# Patient Record
Sex: MALE | Race: WHITE | NOT HISPANIC OR LATINO | Employment: OTHER | URBAN - METROPOLITAN AREA
[De-identification: names, ages, dates, MRNs, and addresses within clinical notes are randomized per-mention and may not be internally consistent; named-entity substitution may affect disease eponyms.]

---

## 2017-01-11 ENCOUNTER — OFFICE VISIT - HEALTHEAST (OUTPATIENT)
Dept: INTERNAL MEDICINE | Facility: CLINIC | Age: 77
End: 2017-01-11

## 2017-01-11 DIAGNOSIS — E55.9 VITAMIN D DEFICIENCY: ICD-10-CM

## 2017-01-11 DIAGNOSIS — E55.9 HYPOVITAMINOSIS D: ICD-10-CM

## 2017-01-11 DIAGNOSIS — Z79.899 ENCOUNTER FOR LONG-TERM (CURRENT) USE OF OTHER MEDICATIONS: ICD-10-CM

## 2017-01-11 DIAGNOSIS — E78.00 HYPERCHOLESTEREMIA: ICD-10-CM

## 2017-01-11 DIAGNOSIS — I10 BP (HIGH BLOOD PRESSURE): ICD-10-CM

## 2017-01-11 DIAGNOSIS — I10 ESSENTIAL HYPERTENSION: ICD-10-CM

## 2017-01-11 DIAGNOSIS — E66.9 OBESITY (BMI 30-39.9): ICD-10-CM

## 2017-01-11 DIAGNOSIS — Z13.29 SCREENING FOR HYPOTHYROIDISM: ICD-10-CM

## 2017-01-12 ENCOUNTER — COMMUNICATION - HEALTHEAST (OUTPATIENT)
Dept: INTERNAL MEDICINE | Facility: CLINIC | Age: 77
End: 2017-01-12

## 2017-01-12 DIAGNOSIS — E78.5 HYPERLIPIDEMIA: ICD-10-CM

## 2017-01-12 LAB
CHOLEST SERPL-MCNC: 175 MG/DL
FASTING STATUS PATIENT QL REPORTED: YES
HDLC SERPL-MCNC: 50 MG/DL
LDLC SERPL CALC-MCNC: 100 MG/DL
TRIGL SERPL-MCNC: 124 MG/DL

## 2017-04-05 ENCOUNTER — COMMUNICATION - HEALTHEAST (OUTPATIENT)
Dept: INTERNAL MEDICINE | Facility: CLINIC | Age: 77
End: 2017-04-05

## 2017-04-05 DIAGNOSIS — I10 UNSPECIFIED ESSENTIAL HYPERTENSION: ICD-10-CM

## 2017-04-28 ENCOUNTER — COMMUNICATION - HEALTHEAST (OUTPATIENT)
Dept: SCHEDULING | Facility: CLINIC | Age: 77
End: 2017-04-28

## 2017-05-01 ENCOUNTER — AMBULATORY - HEALTHEAST (OUTPATIENT)
Dept: NURSING | Facility: CLINIC | Age: 77
End: 2017-05-01

## 2017-05-23 ENCOUNTER — OFFICE VISIT - HEALTHEAST (OUTPATIENT)
Dept: INTERNAL MEDICINE | Facility: CLINIC | Age: 77
End: 2017-05-23

## 2017-05-23 DIAGNOSIS — Z01.818 PRE-OP EXAMINATION: ICD-10-CM

## 2017-05-23 DIAGNOSIS — Z98.42 S/P CATARACT EXTRACTION, LEFT: ICD-10-CM

## 2017-05-23 ASSESSMENT — MIFFLIN-ST. JEOR: SCORE: 1522.95

## 2017-06-28 ENCOUNTER — COMMUNICATION - HEALTHEAST (OUTPATIENT)
Dept: INTERNAL MEDICINE | Facility: CLINIC | Age: 77
End: 2017-06-28

## 2017-06-28 DIAGNOSIS — I10 UNSPECIFIED ESSENTIAL HYPERTENSION: ICD-10-CM

## 2017-07-14 ENCOUNTER — COMMUNICATION - HEALTHEAST (OUTPATIENT)
Dept: INTERNAL MEDICINE | Facility: CLINIC | Age: 77
End: 2017-07-14

## 2017-08-23 ENCOUNTER — RECORDS - HEALTHEAST (OUTPATIENT)
Dept: ADMINISTRATIVE | Facility: OTHER | Age: 77
End: 2017-08-23

## 2017-11-29 ENCOUNTER — RECORDS - HEALTHEAST (OUTPATIENT)
Dept: ADMINISTRATIVE | Facility: OTHER | Age: 77
End: 2017-11-29

## 2018-06-19 ENCOUNTER — RECORDS - HEALTHEAST (OUTPATIENT)
Dept: ADMINISTRATIVE | Facility: OTHER | Age: 78
End: 2018-06-19

## 2018-12-19 ENCOUNTER — COMMUNICATION - HEALTHEAST (OUTPATIENT)
Dept: TELEHEALTH | Facility: CLINIC | Age: 78
End: 2018-12-19

## 2018-12-19 ENCOUNTER — OFFICE VISIT - HEALTHEAST (OUTPATIENT)
Dept: FAMILY MEDICINE | Facility: CLINIC | Age: 78
End: 2018-12-19

## 2018-12-19 DIAGNOSIS — E78.00 HYPERCHOLESTEREMIA: ICD-10-CM

## 2018-12-19 DIAGNOSIS — I10 ESSENTIAL HYPERTENSION: ICD-10-CM

## 2018-12-19 DIAGNOSIS — M25.512 PAIN IN JOINT OF LEFT SHOULDER: ICD-10-CM

## 2018-12-19 DIAGNOSIS — Z00.00 ROUTINE GENERAL MEDICAL EXAMINATION AT A HEALTH CARE FACILITY: ICD-10-CM

## 2018-12-19 DIAGNOSIS — Z12.5 ENCOUNTER FOR SCREENING FOR MALIGNANT NEOPLASM OF PROSTATE: ICD-10-CM

## 2018-12-19 ASSESSMENT — MIFFLIN-ST. JEOR: SCORE: 1484.4

## 2018-12-27 ENCOUNTER — AMBULATORY - HEALTHEAST (OUTPATIENT)
Dept: LAB | Facility: CLINIC | Age: 78
End: 2018-12-27

## 2018-12-27 DIAGNOSIS — Z12.5 ENCOUNTER FOR SCREENING FOR MALIGNANT NEOPLASM OF PROSTATE: ICD-10-CM

## 2018-12-27 DIAGNOSIS — Z00.00 ROUTINE GENERAL MEDICAL EXAMINATION AT A HEALTH CARE FACILITY: ICD-10-CM

## 2018-12-27 LAB
ALBUMIN SERPL-MCNC: 3.5 G/DL (ref 3.5–5)
ALP SERPL-CCNC: 42 U/L (ref 45–120)
ALT SERPL W P-5'-P-CCNC: 17 U/L (ref 0–45)
ANION GAP SERPL CALCULATED.3IONS-SCNC: 8 MMOL/L (ref 5–18)
AST SERPL W P-5'-P-CCNC: 22 U/L (ref 0–40)
BASOPHILS # BLD AUTO: 0 THOU/UL (ref 0–0.2)
BASOPHILS NFR BLD AUTO: 1 % (ref 0–2)
BILIRUB SERPL-MCNC: 0.6 MG/DL (ref 0–1)
BUN SERPL-MCNC: 13 MG/DL (ref 8–28)
CALCIUM SERPL-MCNC: 9.5 MG/DL (ref 8.5–10.5)
CHLORIDE BLD-SCNC: 100 MMOL/L (ref 98–107)
CHOLEST SERPL-MCNC: 150 MG/DL
CO2 SERPL-SCNC: 29 MMOL/L (ref 22–31)
CREAT SERPL-MCNC: 0.86 MG/DL (ref 0.7–1.3)
EOSINOPHIL # BLD AUTO: 0.2 THOU/UL (ref 0–0.4)
EOSINOPHIL NFR BLD AUTO: 3 % (ref 0–6)
ERYTHROCYTE [DISTWIDTH] IN BLOOD BY AUTOMATED COUNT: 12 % (ref 11–14.5)
FASTING STATUS PATIENT QL REPORTED: YES
GFR SERPL CREATININE-BSD FRML MDRD: >60 ML/MIN/1.73M2
GLUCOSE BLD-MCNC: 100 MG/DL (ref 70–125)
HCT VFR BLD AUTO: 39.5 % (ref 40–54)
HDLC SERPL-MCNC: 57 MG/DL
HGB BLD-MCNC: 13.9 G/DL (ref 14–18)
LDLC SERPL CALC-MCNC: 73 MG/DL
LYMPHOCYTES # BLD AUTO: 1.7 THOU/UL (ref 0.8–4.4)
LYMPHOCYTES NFR BLD AUTO: 28 % (ref 20–40)
MCH RBC QN AUTO: 32.1 PG (ref 27–34)
MCHC RBC AUTO-ENTMCNC: 35.3 G/DL (ref 32–36)
MCV RBC AUTO: 91 FL (ref 80–100)
MONOCYTES # BLD AUTO: 0.5 THOU/UL (ref 0–0.9)
MONOCYTES NFR BLD AUTO: 8 % (ref 2–10)
NEUTROPHILS # BLD AUTO: 3.8 THOU/UL (ref 2–7.7)
NEUTROPHILS NFR BLD AUTO: 61 % (ref 50–70)
PLATELET # BLD AUTO: 291 THOU/UL (ref 140–440)
PMV BLD AUTO: 7.6 FL (ref 7–10)
POTASSIUM BLD-SCNC: 4.1 MMOL/L (ref 3.5–5)
PROT SERPL-MCNC: 6.8 G/DL (ref 6–8)
PSA SERPL-MCNC: 0.3 NG/ML (ref 0–6.5)
RBC # BLD AUTO: 4.34 MILL/UL (ref 4.4–6.2)
SODIUM SERPL-SCNC: 137 MMOL/L (ref 136–145)
TRIGL SERPL-MCNC: 101 MG/DL
WBC: 6.2 THOU/UL (ref 4–11)

## 2018-12-28 ENCOUNTER — COMMUNICATION - HEALTHEAST (OUTPATIENT)
Dept: FAMILY MEDICINE | Facility: CLINIC | Age: 78
End: 2018-12-28

## 2019-01-14 ENCOUNTER — COMMUNICATION - HEALTHEAST (OUTPATIENT)
Dept: FAMILY MEDICINE | Facility: CLINIC | Age: 79
End: 2019-01-14

## 2019-01-14 DIAGNOSIS — E78.5 HYPERLIPIDEMIA: ICD-10-CM

## 2019-01-14 DIAGNOSIS — R33.9 URINE RETENTION: ICD-10-CM

## 2019-01-18 ENCOUNTER — RECORDS - HEALTHEAST (OUTPATIENT)
Dept: PEDIATRICS | Facility: CLINIC | Age: 79
End: 2019-01-18

## 2019-05-28 ENCOUNTER — COMMUNICATION - HEALTHEAST (OUTPATIENT)
Dept: FAMILY MEDICINE | Facility: CLINIC | Age: 79
End: 2019-05-28

## 2019-05-28 DIAGNOSIS — I10 ESSENTIAL HYPERTENSION: ICD-10-CM

## 2019-12-02 ENCOUNTER — COMMUNICATION - HEALTHEAST (OUTPATIENT)
Dept: FAMILY MEDICINE | Facility: CLINIC | Age: 79
End: 2019-12-02

## 2019-12-02 DIAGNOSIS — I10 ESSENTIAL HYPERTENSION: ICD-10-CM

## 2019-12-31 ENCOUNTER — COMMUNICATION - HEALTHEAST (OUTPATIENT)
Dept: FAMILY MEDICINE | Facility: CLINIC | Age: 79
End: 2019-12-31

## 2019-12-31 DIAGNOSIS — R33.9 URINE RETENTION: ICD-10-CM

## 2019-12-31 DIAGNOSIS — E78.5 HYPERLIPIDEMIA: ICD-10-CM

## 2020-02-07 ENCOUNTER — AMBULATORY - HEALTHEAST (OUTPATIENT)
Dept: OTHER | Facility: CLINIC | Age: 80
End: 2020-02-07

## 2020-02-07 ENCOUNTER — DOCUMENTATION ONLY (OUTPATIENT)
Dept: OTHER | Facility: CLINIC | Age: 80
End: 2020-02-07

## 2020-02-24 ENCOUNTER — COMMUNICATION - HEALTHEAST (OUTPATIENT)
Dept: FAMILY MEDICINE | Facility: CLINIC | Age: 80
End: 2020-02-24

## 2020-02-24 DIAGNOSIS — I10 ESSENTIAL HYPERTENSION: ICD-10-CM

## 2020-03-02 ENCOUNTER — DOCUMENTATION ONLY (OUTPATIENT)
Dept: OTHER | Facility: CLINIC | Age: 80
End: 2020-03-02

## 2020-03-02 ENCOUNTER — AMBULATORY - HEALTHEAST (OUTPATIENT)
Dept: OTHER | Facility: CLINIC | Age: 80
End: 2020-03-02

## 2020-03-05 ENCOUNTER — OFFICE VISIT - HEALTHEAST (OUTPATIENT)
Dept: FAMILY MEDICINE | Facility: CLINIC | Age: 80
End: 2020-03-05

## 2020-03-05 DIAGNOSIS — I10 ESSENTIAL HYPERTENSION: ICD-10-CM

## 2020-03-05 DIAGNOSIS — Z12.5 ENCOUNTER FOR SCREENING FOR MALIGNANT NEOPLASM OF PROSTATE: ICD-10-CM

## 2020-03-05 DIAGNOSIS — R53.83 FATIGUE, UNSPECIFIED TYPE: ICD-10-CM

## 2020-03-05 DIAGNOSIS — Z00.00 ROUTINE GENERAL MEDICAL EXAMINATION AT A HEALTH CARE FACILITY: ICD-10-CM

## 2020-03-05 LAB
ANION GAP SERPL CALCULATED.3IONS-SCNC: 11 MMOL/L (ref 5–18)
BASOPHILS # BLD AUTO: 0.1 THOU/UL (ref 0–0.2)
BASOPHILS NFR BLD AUTO: 1 % (ref 0–2)
BUN SERPL-MCNC: 13 MG/DL (ref 8–28)
CALCIUM SERPL-MCNC: 9.8 MG/DL (ref 8.5–10.5)
CHLORIDE BLD-SCNC: 97 MMOL/L (ref 98–107)
CHOLEST SERPL-MCNC: 135 MG/DL
CO2 SERPL-SCNC: 25 MMOL/L (ref 22–31)
CREAT SERPL-MCNC: 1.06 MG/DL (ref 0.7–1.3)
EOSINOPHIL # BLD AUTO: 0.2 THOU/UL (ref 0–0.4)
EOSINOPHIL NFR BLD AUTO: 2 % (ref 0–6)
ERYTHROCYTE [DISTWIDTH] IN BLOOD BY AUTOMATED COUNT: 11.6 % (ref 11–14.5)
FASTING STATUS PATIENT QL REPORTED: NORMAL
GFR SERPL CREATININE-BSD FRML MDRD: >60 ML/MIN/1.73M2
GLUCOSE BLD-MCNC: 97 MG/DL (ref 70–125)
HCT VFR BLD AUTO: 43.1 % (ref 40–54)
HDLC SERPL-MCNC: 51 MG/DL
HGB BLD-MCNC: 14.6 G/DL (ref 14–18)
LDLC SERPL CALC-MCNC: 66 MG/DL
LYMPHOCYTES # BLD AUTO: 1.8 THOU/UL (ref 0.8–4.4)
LYMPHOCYTES NFR BLD AUTO: 28 % (ref 20–40)
MCH RBC QN AUTO: 31.6 PG (ref 27–34)
MCHC RBC AUTO-ENTMCNC: 33.8 G/DL (ref 32–36)
MCV RBC AUTO: 94 FL (ref 80–100)
MONOCYTES # BLD AUTO: 0.6 THOU/UL (ref 0–0.9)
MONOCYTES NFR BLD AUTO: 9 % (ref 2–10)
NEUTROPHILS # BLD AUTO: 3.9 THOU/UL (ref 2–7.7)
NEUTROPHILS NFR BLD AUTO: 60 % (ref 50–70)
PLATELET # BLD AUTO: 221 THOU/UL (ref 140–440)
PMV BLD AUTO: 9.3 FL (ref 7–10)
POTASSIUM BLD-SCNC: 4.7 MMOL/L (ref 3.5–5)
PSA SERPL-MCNC: 0.3 NG/ML (ref 0–6.5)
RBC # BLD AUTO: 4.61 MILL/UL (ref 4.4–6.2)
SODIUM SERPL-SCNC: 133 MMOL/L (ref 136–145)
TRIGL SERPL-MCNC: 91 MG/DL
TSH SERPL DL<=0.005 MIU/L-ACNC: 2.55 UIU/ML (ref 0.3–5)
WBC: 6.4 THOU/UL (ref 4–11)

## 2020-03-06 ENCOUNTER — COMMUNICATION - HEALTHEAST (OUTPATIENT)
Dept: FAMILY MEDICINE | Facility: CLINIC | Age: 80
End: 2020-03-06

## 2020-06-24 ENCOUNTER — COMMUNICATION - HEALTHEAST (OUTPATIENT)
Dept: FAMILY MEDICINE | Facility: CLINIC | Age: 80
End: 2020-06-24

## 2020-06-24 DIAGNOSIS — R33.9 URINE RETENTION: ICD-10-CM

## 2020-06-24 DIAGNOSIS — E78.5 HYPERLIPIDEMIA: ICD-10-CM

## 2020-08-07 ENCOUNTER — NURSE TRIAGE (OUTPATIENT)
Dept: NURSING | Facility: CLINIC | Age: 80
End: 2020-08-07

## 2020-08-07 NOTE — TELEPHONE ENCOUNTER
Pt is calling.    He is wanted to get a COVID-19 test, as he was told to call due to his symptoms.  He woke up this AM, not feeling well. Symptoms started yesterday.   Complaining of nausea, vomiting, and diarrhea with some weakness and mild increase in fatigue.   No fever. No shortness of breath, chest pain, wheezing, or cough.  No URI symptoms to report.  He stated that he cannot due any virtual appointment online of through video.   I advised him to schedule a telephone visit with his PCP, or someone else in her office, today, if possible. If the office is full, it is ok to do a telephone visit through the [facility] as well. Care advice reviewed with the patient as well.  I advised him to call back with any new or worsening signs, symptoms, concerns, or questions.   He verbalized understanding.    COVID 19 Nurse Triage Plan/Patient Instructions    Please be aware that novel coronavirus (COVID-19) may be circulating in the community. If you develop symptoms such as fever, cough, or SOB or if you have concerns about the presence of another infection including coronavirus (COVID-19), please contact your health care provider or visit [email or web address].     Disposition/Instructions    Virtual Visit with provider recommended. Reference Visit Selection Guide.    Thank you for taking steps to prevent the spread of this virus.  o Limit your contact with others.  o Wear a simple mask to cover your cough.  o Wash your hands well and often.    Resources    [facility]: About COVID-19: [email or web address]    CDC: What to Do If You're Sick: www.cdc.gov/coronavirus/2019-ncov/about/steps-when-sick.html    CDC: Ending Home Isolation: www.cdc.gov/coronavirus/2019-ncov/hcp/disposition-in-home-patients.html     CDC: Caring for Someone: www.cdc.gov/coronavirus/2019-ncov/if-you-are-sick/care-for-someone.html     [facility]: Interim Guidance for Hospital Discharge to Home:  [email or web address]/coronavirus/hcp/hospdischarge.pdf    [facility] clinical trials (COVID-19 research studies): [email or web address]     Below are the COVID-19 hotlines at the [facility] ([facility]). Interpreters are available.   o For health questions: Call 730-703-4529 or 1-198.973.4863 (7 a.m. to 7 p.m.)  o For questions about schools and childcare: Call 262-175-7170 or 1-164.616.2390 (7 a.m. to 7 p.m.)                     Reason for Disposition    HIGH RISK patient (e.g., age > 64 years, diabetes, heart or lung disease, weak immune system)    Additional Information    Negative: SEVERE difficulty breathing (e.g., struggling for each breath, speaks in single words)    Negative: Difficult to awaken or acting confused (e.g., disoriented, slurred speech)    Negative: Bluish (or gray) lips or face now    Negative: Shock suspected (e.g., cold/pale/clammy skin, too weak to stand, low BP, rapid pulse)    Negative: Sounds like a life-threatening emergency to the triager    Negative: [1] COVID-19 exposure AND [2] no symptoms    Negative: COVID-19 and Breastfeeding, questions about    Negative: [1] Adult with possible COVID-19 symptoms AND [2] triager concerned about severity of symptoms or other causes    Negative: SEVERE or constant chest pain or pressure (Exception: mild central chest pain, present only when coughing)    Negative: MODERATE difficulty breathing (e.g., speaks in phrases, SOB even at rest, pulse 100-120)    Negative: Patient sounds very sick or weak to the triager    Negative: MILD difficulty breathing (e.g., minimal/no SOB at rest, SOB with walking, pulse <100)    Negative: Chest pain or pressure    Negative: Fever > 103 F (39.4 C)    Negative: [1] Fever > 101 F (38.3 C) AND [2] age > 60    Negative: [1] Fever > 100.0 F (37.8 C) AND [2] bedridden (e.g., nursing home patient, CVA, chronic illness, recovering from surgery)    Protocols used:  CORONAVIRUS (COVID-19) DIAGNOSED OR [id number] 5.16.20    Libia Lyn RN  [facility] Triage Nurse Advisor  8/7/2020 at 1:42 PM

## 2020-11-28 ENCOUNTER — COMMUNICATION - HEALTHEAST (OUTPATIENT)
Dept: FAMILY MEDICINE | Facility: CLINIC | Age: 80
End: 2020-11-28

## 2020-11-28 DIAGNOSIS — I10 ESSENTIAL HYPERTENSION: ICD-10-CM

## 2020-12-04 ENCOUNTER — COMMUNICATION - HEALTHEAST (OUTPATIENT)
Dept: FAMILY MEDICINE | Facility: CLINIC | Age: 80
End: 2020-12-04

## 2020-12-04 DIAGNOSIS — I10 ESSENTIAL HYPERTENSION: ICD-10-CM

## 2020-12-14 ENCOUNTER — OFFICE VISIT - HEALTHEAST (OUTPATIENT)
Dept: FAMILY MEDICINE | Facility: CLINIC | Age: 80
End: 2020-12-14

## 2020-12-14 DIAGNOSIS — Z20.822 SUSPECTED 2019 NOVEL CORONAVIRUS INFECTION: ICD-10-CM

## 2020-12-15 ENCOUNTER — AMBULATORY - HEALTHEAST (OUTPATIENT)
Dept: FAMILY MEDICINE | Facility: CLINIC | Age: 80
End: 2020-12-15

## 2020-12-15 DIAGNOSIS — Z20.822 SUSPECTED 2019 NOVEL CORONAVIRUS INFECTION: ICD-10-CM

## 2020-12-16 ENCOUNTER — COMMUNICATION - HEALTHEAST (OUTPATIENT)
Dept: SCHEDULING | Facility: CLINIC | Age: 80
End: 2020-12-16

## 2020-12-21 ENCOUNTER — COMMUNICATION - HEALTHEAST (OUTPATIENT)
Dept: SCHEDULING | Facility: CLINIC | Age: 80
End: 2020-12-21

## 2021-02-18 ENCOUNTER — RECORDS - HEALTHEAST (OUTPATIENT)
Dept: ADMINISTRATIVE | Facility: OTHER | Age: 81
End: 2021-02-18

## 2021-03-01 ENCOUNTER — COMMUNICATION - HEALTHEAST (OUTPATIENT)
Dept: FAMILY MEDICINE | Facility: CLINIC | Age: 81
End: 2021-03-01

## 2021-03-01 DIAGNOSIS — I10 ESSENTIAL HYPERTENSION: ICD-10-CM

## 2021-03-02 RX ORDER — LOSARTAN POTASSIUM AND HYDROCHLOROTHIAZIDE 12.5; 5 MG/1; MG/1
TABLET ORAL
Qty: 90 TABLET | Refills: 2 | Status: SHIPPED | OUTPATIENT
Start: 2021-03-02 | End: 2021-12-19

## 2021-03-29 ENCOUNTER — COMMUNICATION - HEALTHEAST (OUTPATIENT)
Dept: FAMILY MEDICINE | Facility: CLINIC | Age: 81
End: 2021-03-29

## 2021-03-29 DIAGNOSIS — R33.9 URINE RETENTION: ICD-10-CM

## 2021-04-20 ENCOUNTER — COMMUNICATION - HEALTHEAST (OUTPATIENT)
Dept: FAMILY MEDICINE | Facility: CLINIC | Age: 81
End: 2021-04-20

## 2021-04-20 DIAGNOSIS — E78.5 HYPERLIPIDEMIA: ICD-10-CM

## 2021-05-10 ENCOUNTER — COMMUNICATION - HEALTHEAST (OUTPATIENT)
Dept: FAMILY MEDICINE | Facility: CLINIC | Age: 81
End: 2021-05-10

## 2021-05-10 DIAGNOSIS — R33.9 URINE RETENTION: ICD-10-CM

## 2021-05-19 ENCOUNTER — OFFICE VISIT - HEALTHEAST (OUTPATIENT)
Dept: FAMILY MEDICINE | Facility: CLINIC | Age: 81
End: 2021-05-19

## 2021-05-19 DIAGNOSIS — I10 ESSENTIAL HYPERTENSION, BENIGN: ICD-10-CM

## 2021-05-19 DIAGNOSIS — E78.00 HYPERCHOLESTEREMIA: ICD-10-CM

## 2021-05-19 DIAGNOSIS — H35.30 ARMD (AGE RELATED MACULAR DEGENERATION): ICD-10-CM

## 2021-05-19 LAB
ALBUMIN SERPL-MCNC: 3.5 G/DL (ref 3.5–5)
ALP SERPL-CCNC: 46 U/L (ref 45–120)
ALT SERPL W P-5'-P-CCNC: 18 U/L (ref 0–45)
ANION GAP SERPL CALCULATED.3IONS-SCNC: 11 MMOL/L (ref 5–18)
AST SERPL W P-5'-P-CCNC: 24 U/L (ref 0–40)
BASOPHILS # BLD AUTO: 0.1 THOU/UL (ref 0–0.2)
BASOPHILS NFR BLD AUTO: 1 % (ref 0–2)
BILIRUB SERPL-MCNC: 0.5 MG/DL (ref 0–1)
BUN SERPL-MCNC: 20 MG/DL (ref 8–28)
CALCIUM SERPL-MCNC: 8.9 MG/DL (ref 8.5–10.5)
CHLORIDE BLD-SCNC: 99 MMOL/L (ref 98–107)
CHOLEST SERPL-MCNC: 135 MG/DL
CO2 SERPL-SCNC: 25 MMOL/L (ref 22–31)
CREAT SERPL-MCNC: 0.94 MG/DL (ref 0.7–1.3)
EOSINOPHIL # BLD AUTO: 0.2 THOU/UL (ref 0–0.4)
EOSINOPHIL NFR BLD AUTO: 3 % (ref 0–6)
ERYTHROCYTE [DISTWIDTH] IN BLOOD BY AUTOMATED COUNT: 13 % (ref 11–14.5)
FASTING STATUS PATIENT QL REPORTED: NO
GFR SERPL CREATININE-BSD FRML MDRD: >60 ML/MIN/1.73M2
GLUCOSE BLD-MCNC: 103 MG/DL (ref 70–125)
HCT VFR BLD AUTO: 40.5 % (ref 40–54)
HDLC SERPL-MCNC: 46 MG/DL
HGB BLD-MCNC: 13.4 G/DL (ref 14–18)
IMM GRANULOCYTES # BLD: 0 THOU/UL
IMM GRANULOCYTES NFR BLD: 0 %
LDLC SERPL CALC-MCNC: 68 MG/DL
LYMPHOCYTES # BLD AUTO: 2 THOU/UL (ref 0.8–4.4)
LYMPHOCYTES NFR BLD AUTO: 31 % (ref 20–40)
MCH RBC QN AUTO: 31.2 PG (ref 27–34)
MCHC RBC AUTO-ENTMCNC: 33.1 G/DL (ref 32–36)
MCV RBC AUTO: 94 FL (ref 80–100)
MONOCYTES # BLD AUTO: 0.8 THOU/UL (ref 0–0.9)
MONOCYTES NFR BLD AUTO: 12 % (ref 2–10)
NEUTROPHILS # BLD AUTO: 3.5 THOU/UL (ref 2–7.7)
NEUTROPHILS NFR BLD AUTO: 53 % (ref 50–70)
PLATELET # BLD AUTO: 298 THOU/UL (ref 140–440)
PMV BLD AUTO: 9.6 FL (ref 7–10)
POTASSIUM BLD-SCNC: 5 MMOL/L (ref 3.5–5)
PROT SERPL-MCNC: 6.7 G/DL (ref 6–8)
RBC # BLD AUTO: 4.3 MILL/UL (ref 4.4–6.2)
SODIUM SERPL-SCNC: 135 MMOL/L (ref 136–145)
TRIGL SERPL-MCNC: 103 MG/DL
WBC: 6.6 THOU/UL (ref 4–11)

## 2021-05-19 RX ORDER — MELOXICAM 7.5 MG/1
7.5 TABLET ORAL DAILY PRN
Status: SHIPPED | COMMUNITY
Start: 2020-11-02

## 2021-05-20 ENCOUNTER — COMMUNICATION - HEALTHEAST (OUTPATIENT)
Dept: FAMILY MEDICINE | Facility: CLINIC | Age: 81
End: 2021-05-20

## 2021-05-27 VITALS
HEART RATE: 66 BPM | SYSTOLIC BLOOD PRESSURE: 128 MMHG | OXYGEN SATURATION: 97 % | DIASTOLIC BLOOD PRESSURE: 80 MMHG | WEIGHT: 194.6 LBS

## 2021-05-28 ENCOUNTER — RECORDS - HEALTHEAST (OUTPATIENT)
Dept: ADMINISTRATIVE | Facility: CLINIC | Age: 81
End: 2021-05-28

## 2021-05-29 NOTE — TELEPHONE ENCOUNTER
Refill Approved    Rx renewed per Medication Renewal Policy. Medication was last renewed on 12/19/18.    Sophia Beckford, [facility] Connection Triage/Med Refill 5/28/2019     Requested Prescriptions   Pending Prescriptions Disp Refills     losartan-hydrochlorothiazide (HYZAAR) 50-12.5 mg per tablet [Pharmacy Med Name: Losartan Potassium-HCTZ Oral Tablet 50-12.5 MG] 90 tablet 0     Sig: TAKE ONE TABLET BY MOUTH ONE TIME DAILY       Diuretics/Combination Diuretics Refill Protocol  Passed - 5/28/2019  7:47 AM        Passed - Visit with PCP or prescribing provider visit in past 12 months     Last office visit with prescriber/PCP: Visit date not found OR same dept: Visit date not found OR same specialty: Visit date not found  Last physical: 12/19/2018 Last MTM visit: Visit date not found   Next visit within 3 mo: Visit date not found  Next physical within 3 mo: Visit date not found  Prescriber OR PCP: Corie Spears MD  Last diagnosis associated with med order: There are no diagnoses linked to this encounter.  If protocol passes may refill for 12 months if within 3 months of last provider visit (or a total of 15 months).             Passed - Serum Potassium in past 12 months      Lab Results   Component Value Date    Potassium 4.1 12/27/2018             Passed - Serum Sodium in past 12 months      Lab Results   Component Value Date    Sodium 137 12/27/2018             Passed - Blood pressure on file in past 12 months     BP Readings from Last 1 Encounters:   12/19/18 130/74             Passed - Serum Creatinine in past 12 months      Creatinine   Date Value Ref Range Status   12/27/2018 0.86 0.70 - 1.30 mg/dL Final

## 2021-05-30 VITALS — WEIGHT: 200.6 LBS | BODY MASS INDEX: 34.87 KG/M2

## 2021-05-31 VITALS — WEIGHT: 202.8 LBS | BODY MASS INDEX: 34.62 KG/M2 | HEIGHT: 64 IN

## 2021-06-02 ENCOUNTER — RECORDS - HEALTHEAST (OUTPATIENT)
Dept: ADMINISTRATIVE | Facility: CLINIC | Age: 81
End: 2021-06-02

## 2021-06-02 VITALS — WEIGHT: 194.3 LBS | BODY MASS INDEX: 33.17 KG/M2 | HEIGHT: 64 IN

## 2021-06-03 NOTE — TELEPHONE ENCOUNTER
Refill Approved    Rx renewed per Medication Renewal Policy. Medication was last renewed on 5/28/19.    Denise Spangler, [facility] Connection Triage/Med Refill 12/2/2019     Requested Prescriptions   Pending Prescriptions Disp Refills     losartan-hydrochlorothiazide (HYZAAR) 50-12.5 mg per tablet [Pharmacy Med Name: Losartan Potassium-HCTZ Oral Tablet 50-12.5 MG] 90 tablet 0     Sig: TAKE ONE TABLET BY MOUTH ONE TIME DAILY       Diuretics/Combination Diuretics Refill Protocol  Passed - 12/2/2019 12:17 PM        Passed - Visit with PCP or prescribing provider visit in past 12 months     Last office visit with prescriber/PCP: Visit date not found OR same dept: Visit date not found OR same specialty: Visit date not found  Last physical: 12/19/2018 Last MTM visit: Visit date not found   Next visit within 3 mo: Visit date not found  Next physical within 3 mo: Visit date not found  Prescriber OR PCP: Corie Spears MD  Last diagnosis associated with med order: 1. BP - high blood pressure  - losartan-hydrochlorothiazide (HYZAAR) 50-12.5 mg per tablet [Pharmacy Med Name: Losartan Potassium-HCTZ Oral Tablet 50-12.5 MG]; TAKE ONE TABLET BY MOUTH ONE TIME DAILY   Dispense: 90 tablet; Refill: 0    If protocol passes may refill for 12 months if within 3 months of last provider visit (or a total of 15 months).             Passed - Serum Potassium in past 12 months      Lab Results   Component Value Date    Potassium 4.1 12/27/2018             Passed - Serum Sodium in past 12 months      Lab Results   Component Value Date    Sodium 137 12/27/2018             Passed - Blood pressure on file in past 12 months     BP Readings from Last 1 Encounters:   12/19/18 130/74             Passed - Serum Creatinine in past 12 months      Creatinine   Date Value Ref Range Status   12/27/2018 0.86 0.70 - 1.30 mg/dL Final

## 2021-06-04 VITALS
SYSTOLIC BLOOD PRESSURE: 120 MMHG | BODY MASS INDEX: 33.53 KG/M2 | HEART RATE: 62 BPM | DIASTOLIC BLOOD PRESSURE: 76 MMHG | WEIGHT: 192.3 LBS | OXYGEN SATURATION: 94 %

## 2021-06-04 NOTE — TELEPHONE ENCOUNTER
Refill Given    Refill given per Policy, patient informed they are overdue for Office Visit   OV 12/19/18    Denise Spangler, [facility] Connection Triage/Med Refill 12/31/2019    Requested Prescriptions   Pending Prescriptions Disp Refills     tamsulosin (FLOMAX) 0.4 mg cap [Pharmacy Med Name: Tamsulosin HCl Oral Capsule 0.4 MG] 90 capsule 2     Sig: TAKE ONE CAPSULE BY MOUTH AT BEDTIME       Alfuzosin/Tamsulosin/Silodosin Refill Protocol  Failed - 12/31/2019 11:32 AM        Failed - PCP or prescribing provider visit in past 12 months       Last office visit with prescriber/PCP: Visit date not found OR same dept: Visit date not found OR same specialty: Visit date not found  Last physical: 12/19/2018 Last MTM visit: Visit date not found   Next visit within 3 mo: Visit date not found  Next physical within 3 mo: Visit date not found  Prescriber OR PCP: Corie Spears MD  Last diagnosis associated with med order: 1. HISTORY of urinary retention  - tamsulosin (FLOMAX) 0.4 mg cap [Pharmacy Med Name: Tamsulosin HCl Oral Capsule 0.4 MG]; TAKE ONE CAPSULE BY MOUTH AT BEDTIME   Dispense: 90 capsule; Refill: 2    2. Hyperlipidemia  - lovastatin (MEVACOR) 40 MG tablet [Pharmacy Med Name: Lovastatin Oral Tablet 40 MG]; TAKE HALF TABLET BY MOUTH AT BEDTIME   Dispense: 45 tablet; Refill: 2    If protocol passes may refill for 12 months if within 3 months of last provider visit (or a total of 15 months).             lovastatin (MEVACOR) 40 MG tablet [Pharmacy Med Name: Lovastatin Oral Tablet 40 MG] 45 tablet 2     Sig: TAKE HALF TABLET BY MOUTH AT BEDTIME       Statins Refill Protocol (Hmg CoA Reductase Inhibitors) Failed - 12/31/2019 11:32 AM        Failed - PCP or prescribing provider visit in past 12 months      Last office visit with prescriber/PCP: Visit date not found OR same dept: Visit date not found OR same specialty: Visit date not found  Last physical: 12/19/2018 Last MTM visit: Visit date not found   Next visit within 3  mo: Visit date not found  Next physical within 3 mo: Visit date not found  Prescriber OR PCP: Corie Spears MD  Last diagnosis associated with med order: 1. HISTORY of urinary retention  - tamsulosin (FLOMAX) 0.4 mg cap [Pharmacy Med Name: Tamsulosin HCl Oral Capsule 0.4 MG]; TAKE ONE CAPSULE BY MOUTH AT BEDTIME   Dispense: 90 capsule; Refill: 2    2. Hyperlipidemia  - lovastatin (MEVACOR) 40 MG tablet [Pharmacy Med Name: Lovastatin Oral Tablet 40 MG]; TAKE HALF TABLET BY MOUTH AT BEDTIME   Dispense: 45 tablet; Refill: 2    If protocol passes may refill for 12 months if within 3 months of last provider visit (or a total of 15 months).

## 2021-06-06 NOTE — TELEPHONE ENCOUNTER
Called and lvm for patient to call back and schedule patient- Mailed med check letter to address on file

## 2021-06-06 NOTE — PROGRESS NOTES
Family Medicine Office Visit  [facility]  Patient Name: Jonathan Blackwell  Patient Age: 79 y.o.  YOB: 1940  MRN: [id number]    Date of Visit: 3/5/2020  Reason for Office Visit:   Chief Complaint   Patient presents with     Med Check           Assessment / Plan / Medical Decision Makin. BP - high blood pressure  Well controlled - refill medication  - losartan-hydrochlorothiazide (HYZAAR) 50-12.5 mg per tablet; Take 1 tablet by mouth daily.  Dispense: 90 tablet; Refill: 2    2. Routine general medical examination at a health care facility  Routine labs recommended.  Recommend AWV and discussed with pt  - Basic Metabolic Panel  - HM1(CBC and Differential)  - Lipid Cascade RANDOM  - PSA, Annual Screen (Prostatic-Specific Antigen)  - HM1 (CBC with Diff)    3. Encounter for screening for malignant neoplasm of prostate   - PSA, Annual Screen (Prostatic-Specific Antigen)    4. Fatigue, unspecified type  Tired and more fatigued.  Recommend further workup with PFT and thyroid labs.  Pt would like only the labs  - Thyroid [place]        Health Maintenance Review  Health Maintenance   Topic Date Due     ZOSTER VACCINES (2 of 3) 10/03/2016     INFLUENZA VACCINE RULE BASED (1) 2019     FALL RISK ASSESSMENT  2019     MEDICARE ANNUAL WELLNESS VISIT  2021     TD 18+ HE  2024     ADVANCE CARE PLANNING  2025     PNEUMOCOCCAL IMMUNIZATION 65+ LOW/MEDIUM RISK  Completed         I am having Jonathan Blackwell maintain his aspirin, MULTIVITAMIN ORAL, cholecalciferol (vitamin D3), amoxicillin, tamsulosin, lovastatin, and losartan-hydrochlorothiazide.      HPI:  Jonathan Blackwell is a 79 y.o. year old who presents to the office today for med check.  Having to stop walking due to fatigue.  Mall walker and feels like balance is slightly less than previously was.  Wanting to look into lifeline screening, discussed AWV and the workup we could do in the clinic  here but pt declined today.    Review of Systems- pertinent positive in bold:  Constitutional: Fever, chills, night sweats, fainting, weight change, fatigue, seizures, dizziness, sleeping difficulties, loud snoring/pauses in breathing  Eyes: change in vision, blurred or double vision, redness/eye pain  Ears, nose, mouth, throat: change in hearing, ear pain, hoarseness, difficulty swallowing, sores in the mouth or throat  Respiratory: shortness of breath, cough, bloody sputum, wheezing  Cardiovascular: chest pain, palpitations   Gastrointestinal: abdominal pain, heartburn/indigestion, nausea/vomiting, change in appetite, change in bowel habits, constipation or diarrhea, rectal bleeding/dark stools, difficulty swallowing  Urinary: painful urination, frequent urination, urinary urgency/incontinence, blood in urine/dark urine, nocturia  Musculoskeletal: backache/back pain (new or increasing), weakness, joint pain/stiffness (new or increasing), muscle cramps, swelling of hands, feet, ankles, leg pain/redness  Skin: change in moles/freckles, rash, nodules  Hematologic/lymphatic: swollen lymph glands, abnormal bruising/bleeding  Endocrine: excessive thirst/urination, cold or heat intolerance  Neurologic/emotional: worrisome memory change, numbness/tingling, anxiety, mood swings      Current Scheduled Meds:  Outpatient Encounter Medications as of 3/5/2020   Medication Sig Dispense Refill     aspirin 81 MG EC tablet Take 81 mg by mouth daily.        cholecalciferol, vitamin D3, 1,000 unit tablet Take 2,000 Units by mouth daily.       losartan-hydrochlorothiazide (HYZAAR) 50-12.5 mg per tablet Take 1 tablet by mouth daily. 90 tablet 2     lovastatin (MEVACOR) 40 MG tablet TAKE HALF TABLET BY MOUTH AT BEDTIME 45 tablet 1     MULTIVITAMIN ORAL Take 1 tablet by mouth 3 (three) times a week.        tamsulosin (FLOMAX) 0.4 mg cap TAKE ONE CAPSULE BY MOUTH AT BEDTIME 90 capsule 1     [DISCONTINUED] losartan-hydrochlorothiazide  (HYZAAR) 50-12.5 mg per tablet Take 1 tablet by mouth daily. 90 tablet 0     amoxicillin (AMOXIL) 500 MG tablet TAKE 4 TABLETS ONE HOUR BEFORE DENTAL APPOINTMENT 12 tablet 0     No facility-administered encounter medications on file as of 3/5/2020.      Past Medical History:   Diagnosis Date     Anemia 10/9/2014     BPH (benign prostatic hyperplasia)      Hyperlipidemia      Hypertension      Past Surgical History:   Procedure Laterality Date     NC APPENDECTOMY      Description: Appendectomy;  Recorded: 2014;     NC LAP,CHOLECYSTECTOMY      Description: Cholecystectomy Laparoscopic;  Recorded: 2014;     NC RECONSTR TOTAL SHOULDER IMPLANT Left 10/9/2014    Procedure: SHOULDER TOTAL REPLACEMENT ;  Surgeon: Binu Rodriguez MD;  Location: [facility];  Service: Orthopedics     NC RECONSTR TOTAL SHOULDER IMPLANT      Description: Shoulder Arthroplasty Total Shoulder Replacement;  Recorded: 2014;  Comments: LEFT:   9 OCT 2014     Social History     Tobacco Use     Smoking status: Former Smoker     Last attempt to quit: 1995     Years since quittin.6     Smokeless tobacco: Never Used     Tobacco comment: Quit date was in .  Precise date is nominal.   Substance Use Topics     Alcohol use: Yes     Alcohol/week: 1.0 standard drinks     Types: 1 Cans of beer per week     Comment: Beer when golfing.  A little wine.  Does not drink regularly.     Drug use: No       Objective / Physical Examination:  Vitals:    20 1000   BP: 120/76   Patient Site: Right Arm   Patient Position: Sitting   Cuff Size: Adult Regular   Pulse: 62   SpO2: 94%   Weight: 192 lb 4.8 oz (87.2 kg)     Wt Readings from Last 3 Encounters:   20 192 lb 4.8 oz (87.2 kg)   18 194 lb 4.8 oz (88.1 kg)   17 202 lb 12.8 oz (92 kg)     BP Readings from Last 3 Encounters:   20 120/76   18 130/74   17 106/70     Body mass index is 33.53 kg/m .   Results for orders placed or performed in visit on  03/05/20   Basic Metabolic Panel   Result Value Ref Range    Sodium 133 (L) 136 - 145 mmol/L    Potassium 4.7 3.5 - 5.0 mmol/L    Chloride 97 (L) 98 - 107 mmol/L    CO2 25 22 - 31 mmol/L    Anion Gap, Calculation 11 5 - 18 mmol/L    Glucose 97 70 - 125 mg/dL    Calcium 9.8 8.5 - 10.5 mg/dL    BUN 13 8 - 28 mg/dL    Creatinine 1.06 0.70 - 1.30 mg/dL    GFR MDRD Af Amer >60 >60 mL/min/1.73m2    GFR MDRD Non Af Amer >60 >60 mL/min/1.73m2   Lipid Cascade RANDOM   Result Value Ref Range    Cholesterol 135 <=199 mg/dL    Triglycerides 91 <=149 mg/dL    HDL Cholesterol 51 >=40 mg/dL    LDL Calculated 66 <=129 mg/dL    Patient Fasting > 8hrs? Unknown    PSA, Annual Screen (Prostatic-Specific Antigen)   Result Value Ref Range    PSA 0.3 0.0 - 6.5 ng/mL   HM1 (CBC with Diff)   Result Value Ref Range    WBC 6.4 4.0 - 11.0 thou/uL    RBC 4.61 4.40 - 6.20 mill/uL    Hemoglobin 14.6 14.0 - 18.0 g/dL    Hematocrit 43.1 40.0 - 54.0 %    MCV 94 80 - 100 fL    MCH 31.6 27.0 - 34.0 pg    MCHC 33.8 32.0 - 36.0 g/dL    RDW 11.6 11.0 - 14.5 %    Platelets 221 140 - 440 thou/uL    MPV 9.3 7.0 - 10.0 fL    Neutrophils % 60 50 - 70 %    Lymphocytes % 28 20 - 40 %    Monocytes % 9 2 - 10 %    Eosinophils % 2 0 - 6 %    Basophils % 1 0 - 2 %    Neutrophils Absolute 3.9 2.0 - 7.7 thou/uL    Lymphocytes Absolute 1.8 0.8 - 4.4 thou/uL    Monocytes Absolute 0.6 0.0 - 0.9 thou/uL    Eosinophils Absolute 0.2 0.0 - 0.4 thou/uL    Basophils Absolute 0.1 0.0 - 0.2 thou/uL   Thyroid Cascade   Result Value Ref Range    TSH 2.55 0.30 - 5.00 uIU/mL           General Appearance: Alert and oriented, cooperative, affect appropriate, speech clear, in no apparent distress  Head: Normocephalic, atraumatic  Lungs: Clear to auscultation bilaterally. Normal inspiratory and expiratory effort  Cardiovascular: Regular rate, normal S1, S2. No murmurs, rubs, or gallops  Abdomen: Bowel sounds active all four quadrants. Soft, non-tender. No hepatomegaly or splenomegaly.  No bruits detected.   Extremities: Pulses 2+ and equal throughout. No edema. Strength equal throughout.  Integumentary: Warm and dry. Without suspicious looking lesions  Neuro: Alert and oriented, follows commands appropriately.     Orders Placed This Encounter   Procedures     Basic Metabolic Panel     Lipid Cascade RANDOM     PSA, Annual Screen (Prostatic-Specific Antigen)     HM1 (CBC with Diff)     Thyroid [place]   Followup: No follow-ups on file. earlier if needed.    Total time spent with patient was 30 min with >50% of time spent in face-to-face counseling regarding the above plan       Corie Spears MD

## 2021-06-06 NOTE — TELEPHONE ENCOUNTER
Refill NOT Given    Refill given per Policy, patient informed they are overdue for Labs and Office Visit   OV 12/19/18    Denise Spangler, [facility] Connection Triage/Med Refill 2/24/2020    Requested Prescriptions   Pending Prescriptions Disp Refills     losartan-hydrochlorothiazide (HYZAAR) 50-12.5 mg per tablet [Pharmacy Med Name: Losartan Potassium-HCTZ Oral Tablet 50-12.5 MG] 90 tablet 0     Sig: TAKE ONE TABLET BY MOUTH ONE TIME DAILY       Diuretics/Combination Diuretics Refill Protocol  Failed - 2/24/2020 12:50 PM        Failed - Visit with PCP or prescribing provider visit in past 12 months     Last office visit with prescriber/PCP: Visit date not found OR same dept: Visit date not found OR same specialty: Visit date not found  Last physical: 12/19/2018 Last MTM visit: Visit date not found   Next visit within 3 mo: Visit date not found  Next physical within 3 mo: Visit date not found  Prescriber OR PCP: Corie Spears MD  Last diagnosis associated with med order: 1. BP - high blood pressure  - losartan-hydrochlorothiazide (HYZAAR) 50-12.5 mg per tablet [Pharmacy Med Name: Losartan Potassium-HCTZ Oral Tablet 50-12.5 MG]; TAKE ONE TABLET BY MOUTH ONE TIME DAILY   Dispense: 90 tablet; Refill: 0    If protocol passes may refill for 12 months if within 3 months of last provider visit (or a total of 15 months).             Failed - Serum Potassium in past 12 months      No results found for: LN-POTASSIUM          Failed - Serum Sodium in past 12 months      No results found for: LN-SODIUM          Failed - Blood pressure on file in past 12 months     BP Readings from Last 1 Encounters:   12/19/18 130/74             Failed - Serum Creatinine in past 12 months      Creatinine   Date Value Ref Range Status   12/27/2018 0.86 0.70 - 1.30 mg/dL Final

## 2021-06-08 ENCOUNTER — COMMUNICATION - HEALTHEAST (OUTPATIENT)
Dept: FAMILY MEDICINE | Facility: CLINIC | Age: 81
End: 2021-06-08

## 2021-06-08 DIAGNOSIS — E78.5 HYPERLIPIDEMIA: ICD-10-CM

## 2021-06-08 DIAGNOSIS — R33.9 URINE RETENTION: ICD-10-CM

## 2021-06-08 NOTE — PROGRESS NOTES
"1/11/2017     Visit Vitals     /90 (Patient Site: Left Arm, Patient Position: Sitting, Cuff Size: Adult Regular)     Pulse 80     Wt 200 lb 9.6 oz (91 kg)     SpO2 97%     BMI 34.87 kg/m2       Past Medical History   Diagnosis Date     Anemia 10/9/2014     BPH (benign prostatic hyperplasia)      Hyperlipidemia      Hypertension        Social History     Social History     Marital status:      Spouse name: N/A     Number of children: N/A     Years of education: N/A     Occupational History     Not on file.     Social History Main Topics     Smoking status: Former Smoker     Quit date: 7/1/1995     Smokeless tobacco: Never Used      Comment: Quit date was in 1995.  Precise date is nominal.     Alcohol use 0.6 oz/week     1 Cans of beer per week      Comment: Beer when golfing.  A little wine.  Does not drink regularly.     Drug use: No     Sexual activity: Not on file     Other Topics Concern     Not on file     Social History Narrative       Family History   Problem Relation Age of Onset     Other Mother      Cause of death unclear to him.     Dementia Father      \"Might have had a little bit of Alzheimer's coming on.\"     Other Father      Cause of death unclear to him.     Aneurysm Brother      SAH from brain aneurysm.     Diabetes Maternal Grandmother      No Medical Problems Daughter      No Medical Problems Son        As part of this visit I have today personally reviewed past medical history, family medical history, social history (specifically including tobacco use), current medications and intolerances/allergies.  I have updated and/or or corrected these areas of history as may have been appropriate.    Allergies   Allergen Reactions     Lisinopril Other (See Comments)     Swollen lips       Current Outpatient Prescriptions   Medication Sig Note     amoxicillin (AMOXIL) 500 MG tablet TAKE 4 TABLETS ONE HOUR BEFORE DENTAL APPOINTMENT      aspirin 81 MG EC tablet Take 81 mg by mouth 3 (three) times " "a week.      cholecalciferol, vitamin D3, 1,000 unit tablet Take 2,000 Units by mouth daily.      hydrochlorothiazide (HYDRODIURIL) 25 MG tablet Take 1 tablet (25 mg total) by mouth daily. This Rx will not be filled again without seeing a [facility] physician.  Pharm:  Read note. (Patient taking differently: Take 12.5 mg by mouth daily. )      lovastatin (MEVACOR) 40 MG tablet Take 0.5 tablets (20 mg total) by mouth bedtime.      MULTIVITAMIN ORAL Take 1 tablet by mouth 3 (three) times a week.  1/11/2017: Takes this three times a week.  1/11/2017      NIFEdipine (ADALAT CC) 60 MG 24 hr tablet TAKE 1 TABLET BY MOUTH DAILY      tamsulosin (FLOMAX) 0.4 mg Cp24 TAKE ONE CAPSULE BY MOUTH EVERY NIGHT AT BEDTIME.  Pharmacist:  Please read note.        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       The following high BMI interventions were performed this visit: weight monitoring.  I encouraged prudent, restricted calorie diet an    Hypovitaminosis D - will update today.  VITAMIN D, TOTAL (25-HYDROXY)   Date Value Ref Range Status   11/18/2014 57.4 30.0 - 80.0 ng/mL Final       Lipid status - satisfactory cholesterol values on l  Lab Results   Component Value Date    CHOL 154 01/08/2016    CHOL 162 11/18/2014    CHOL 186 01/09/2014     Lab Results   Component Value Date    HDL 51 01/08/2016    HDL 56 11/18/2014    HDL 59 01/09/2014     Lab Results   Component Value Date    LDLCALC 83 01/08/2016    LDLCALC 85 11/18/2014    LDLCALC 102 01/09/2014     Lab Results   Component Value Date    TRIG 99 01/08/2016    " TRIG 106 11/18/2014    TRIG 120 01/09/2014       Thyroid status - will obtain today or shortly.  No results found for: TSH    CBC monitoring - normal when last checked.  Lab Results   Component Value Date    WBC 5.9 01/08/2016    HGB 14.7 01/08/2016    HCT 43.9 01/08/2016    MCV 91 01/08/2016     01/08/2016       Results for orders placed or performed in visit on 01/08/16   Comprehensive Metabolic Panel   Result Value Ref Range    Sodium 133 (L) 136 - 145 mmol/L    Potassium 4.1 3.5 - 5.0 mmol/L    Chloride 97 (L) 98 - 107 mmol/L    CO2 27 22 - 31 mmol/L    Anion Gap, Calculation 9 5 - 18 mmol/L    Glucose 99 70 - 125 mg/dL    BUN 11 8 - 28 mg/dL    Creatinine 0.95 0.70 - 1.30 mg/dL    GFR MDRD Af Amer >60 >60 mL/min/1.73m2    GFR MDRD Non Af Amer >60 >60 mL/min/1.73m2    Bilirubin, Total 0.6 0.0 - 1.0 mg/dL    Calcium 9.9 8.5 - 10.5 mg/dL    Protein, Total 6.9 6.0 - 8.0 g/dL    Albumin 3.8 3.5 - 5.0 g/dL    Alkaline Phosphatase 55 45 - 120 U/L    AST 30 0 - 40 U/L    ALT 26 0 - 45 U/L     Hypertension    History of present illness:    1.  Established hypertension  2.  Control on current medication(s) is borderline  3.  Made worse by not being on BP medication(s)  4.  Made better by being on BP medication(s)  5.  Complications of hypertension - none yet identified.    Review of systems  Although entered by template, all of the system review items listed below have been verified as a result of questions individually asked during today's visit.  Cardiovascular - denies typical and atypical angina, orthopnea, PND, palpitations, edema, syncope and limiting dyspnea.  Pulmonary - denies chronic cough, congestion, wheezing and noisy breathing.  Vision - denies blurred vision, double vision and temporary loss of vision.  NM - denies transient or ongoing motor or sensory complaints as might be seen with TIA or stroke.    Physical examination  General - this is a 76 year old  American gentleman in no  Chest -  CTAP  Heart - regular and without murmur  Ext - no edema    Impression    1.  Hypertension  2.  Obesity  3.  Unknown cholesterol status  4.  Hypercholesterolemia.  5.  History of anemia    Plan    1.  CBC, CMP thyroid cascade, lipid cascade and Vitamin D  2.  RV 6 months.  3.  Follow-up on lab studies if indicated.      Much or all of the text in this note was generated through the use of Dragon Dictate voice-to-text software.  Errors in spelling or words which seem out of context are unintentional.  Dragon has a significant issue with pronouns and, of course, homonyms.  Errors with words of this sort may escape editing.        Patient Instructions   1.  You no longer need to have an antibiotic prior to dental work, but that will change if you have more joint surgery this fall.  The standards for treatment prior to dental work have changed.    2.  Go to the lab today.    3.  Come back in six months for an Annual Wellness Visit.  Make that appointment as you leave today, please.

## 2021-06-09 RX ORDER — TAMSULOSIN HYDROCHLORIDE 0.4 MG/1
CAPSULE ORAL
Qty: 90 CAPSULE | Refills: 3 | Status: SHIPPED | OUTPATIENT
Start: 2021-06-09 | End: 2022-06-24

## 2021-06-09 NOTE — TELEPHONE ENCOUNTER
Refill Approved    Rx renewed per Medication Renewal Policy. Medication was last renewed on 12/31/19.    Sophia Beckford, [facility] Triage/Med Refill 6/25/2020     Requested Prescriptions   Pending Prescriptions Disp Refills     tamsulosin (FLOMAX) 0.4 mg cap [Pharmacy Med Name: Tamsulosin HCl Oral Capsule 0.4 MG] 90 capsule 0     Sig: TAKE ONE CAPSULE BY MOUTH AT BEDTIME       Alfuzosin/Tamsulosin/Silodosin Refill Protocol  Passed - 6/24/2020  1:14 PM        Passed - PCP or prescribing provider visit in past 12 months       Last office visit with prescriber/PCP: 3/5/2020 Corie Spears MD OR same dept: 3/5/2020 Corie Spears MD OR same specialty: 3/5/2020 Corie Spears MD  Last physical: 12/19/2018 Last MTM visit: Visit date not found   Next visit within 3 mo: Visit date not found  Next physical within 3 mo: Visit date not found  Prescriber OR PCP: Corie Spears MD  Last diagnosis associated with med order: 1. HISTORY of urinary retention  - tamsulosin (FLOMAX) 0.4 mg cap [Pharmacy Med Name: Tamsulosin HCl Oral Capsule 0.4 MG]; TAKE ONE CAPSULE BY MOUTH AT BEDTIME  Dispense: 90 capsule; Refill: 0    2. Hyperlipidemia  - lovastatin (MEVACOR) 40 MG tablet [Pharmacy Med Name: Lovastatin Oral Tablet 40 MG]; TAKE HALF TABLET BY MOUTH AT BEDTIME  Dispense: 45 tablet; Refill: 0    If protocol passes may refill for 12 months if within 3 months of last provider visit (or a total of 15 months).                lovastatin (MEVACOR) 40 MG tablet [Pharmacy Med Name: Lovastatin Oral Tablet 40 MG] 45 tablet 0     Sig: TAKE HALF TABLET BY MOUTH AT BEDTIME       Statins Refill Protocol (Hmg CoA Reductase Inhibitors) Passed - 6/24/2020  1:14 PM        Passed - PCP or prescribing provider visit in past 12 months      Last office visit with prescriber/PCP: 3/5/2020 Corie Spears MD OR same dept: 3/5/2020 Corie Spears MD OR same specialty: 3/5/2020 Corie Spears MD  Last physical: 12/19/2018 Last MTM visit: Visit  date not found   Next visit within 3 mo: Visit date not found  Next physical within 3 mo: Visit date not found  Prescriber OR PCP: Corie Spears MD  Last diagnosis associated with med order: 1. HISTORY of urinary retention  - tamsulosin (FLOMAX) 0.4 mg cap [Pharmacy Med Name: Tamsulosin HCl Oral Capsule 0.4 MG]; TAKE ONE CAPSULE BY MOUTH AT BEDTIME  Dispense: 90 capsule; Refill: 0    2. Hyperlipidemia  - lovastatin (MEVACOR) 40 MG tablet [Pharmacy Med Name: Lovastatin Oral Tablet 40 MG]; TAKE HALF TABLET BY MOUTH AT BEDTIME  Dispense: 45 tablet; Refill: 0    If protocol passes may refill for 12 months if within 3 months of last provider visit (or a total of 15 months).

## 2021-06-10 RX ORDER — LOVASTATIN 40 MG
TABLET ORAL
Qty: 45 TABLET | Refills: 0 | Status: SHIPPED | OUTPATIENT
Start: 2021-06-10 | End: 2021-10-25

## 2021-06-10 NOTE — PROGRESS NOTES
"Preoperative H&P    Surgeon: Dr Snow   Date Of Surgery: 5/26/17  Procedure: YAG Capsulotomy - Left Eye    History of Present Illness:  Jonathan Blackwell is a 76 y.o.  male who presents to the office today for a preoperative consultation at the request of Dr. Snow for Yag capsulotomy for cataract correction. He has cloudiness in left eye after cataract extraction There is no history of general or local anesthesia issues in the past. Denies history or recent abnormal bleeding     Review of Systems:   CV: Chest pain- not noted. Shortness of breath- not noted. Edema- not noted. JAMES- not noted. Orthopnea- not noted.   GI: Abdominal pain- not noted. Nausea\vomiting\diarrhea-not noted. Melena or hematochezia- not noted.   : Urgency-not noted. Frequency- not noted. Dysuria- not noted. Hematuria- not noted. Incontinence- not noted.   CNS: Headaches- no significant symptoms. Dizziness- not noted. Visual Changes- cloudy left eye     Physical Examination:    /70 (Patient Site: Right Arm, Patient Position: Sitting, Cuff Size: Adult Large)  Pulse 94  Temp 97  F (36.1  C) (Oral)   Ht 5' 3.5\" (1.613 m)  Wt 202 lb 12.8 oz (92 kg)  SpO2 95%  BMI 35.36 kg/m2  Wt Readings from Last 3 Encounters:   05/23/17 202 lb 12.8 oz (92 kg)   01/11/17 200 lb 9.6 oz (91 kg)   07/08/16 194 lb 3.2 oz (88.1 kg)     Body mass index is 35.36 kg/(m^2). (>25?)    GEN: alert, cooperative, no acute distress  ENT: NCAT. Tympanic membranes: within normal limits. External Canals: within normal limits.   Oropharynx: moist, no lesions, clear. Eyes: PERRLA, Conjunctiva: normal.   Neck: Supple, no adenopathy, no abnormal masses.  Chest: Within normal limits.  Cardiac: RRR, no rubs, no gallops.  Lungs: Breath Sounds normal, no crackles, no wheezing, no rhonchi.   Abd: soft, bowel sounds normal, no tenderness.  Extr: no edema.   Skin: No rashes    Outpatient Encounter Prescriptions as of 5/23/2017   Medication Sig Dispense Refill     amoxicillin " (AMOXIL) 500 MG tablet TAKE 4 TABLETS ONE HOUR BEFORE DENTAL APPOINTMENT 12 tablet 0     aspirin 81 MG EC tablet Take 81 mg by mouth 3 (three) times a week.       cholecalciferol, vitamin D3, 1,000 unit tablet Take 2,000 Units by mouth daily.       hydroCHLOROthiazide (HYDRODIURIL) 25 MG tablet TAKE 1 TABLET BY MOUTH DAILY 90 tablet 0     lovastatin (MEVACOR) 40 MG tablet TAKE 1/2 TABLET(20 MG) BY MOUTH EVERY NIGHT AT BEDTIME 45 tablet 2     MULTIVITAMIN ORAL Take 1 tablet by mouth 3 (three) times a week.        NIFEdipine (ADALAT CC) 60 MG 24 hr tablet TAKE 1 TABLET BY MOUTH DAILY 90 tablet 3     tamsulosin (FLOMAX) 0.4 mg Cp24 TAKE ONE CAPSULE BY MOUTH EVERY NIGHT AT BEDTIME.  Pharmacist:  Please read note. 90 capsule 3     No facility-administered encounter medications on file as of 5/23/2017.      Past Medical History:   Diagnosis Date     Anemia 10/9/2014     BPH (benign prostatic hyperplasia)      Hyperlipidemia      Hypertension      Past Surgical History:   Procedure Laterality Date     SC APPENDECTOMY      Description: Appendectomy;  Recorded: 11/13/2014;     SC LAP,CHOLECYSTECTOMY      Description: Cholecystectomy Laparoscopic;  Recorded: 11/13/2014;     SC RECONSTR TOTAL SHOULDER IMPLANT Left 10/9/2014    Procedure: SHOULDER TOTAL REPLACEMENT ;  Surgeon: Binu Rodriguez MD;  Location: [facility];  Service: Orthopedics     SC RECONSTR TOTAL SHOULDER IMPLANT      Description: Shoulder Arthroplasty Total Shoulder Replacement;  Recorded: 11/13/2014;  Comments: LEFT:   9 OCT 2014     Social History   Substance Use Topics     Smoking status: Former Smoker     Quit date: 7/1/1995     Smokeless tobacco: Never Used      Comment: Quit date was in 1995.  Precise date is nominal.     Alcohol use 0.6 oz/week     1 Cans of beer per week      Comment: Beer when golfing.  A little wine.  Does not drink regularly.     Allergies   Allergen Reactions     Lisinopril Other (See Comments)     Swollen lips     Diagnoses:      Encounter Diagnoses   Name Primary?     Pre-op examination Yes     S/P cataract extraction, left       1. Pre-op examination for YAG capsulotomy     2. S/P cataract extraction, left    Discussion-Plan:     Labs: none needed EKG: not needed    -Prophylaxis for cardiac events with perioperative beta-blockers: clinical risk factors  not indicated.  -can take daily meds with a sip of water morning of surgery     Cardiac Risk Estimation: RCRI for planned surgery is < 1%    Clinical Risk Factors:     -Cardiac: No recent hx of MI, valvular disease, CHF or abnormal EKG . Blood pressure well controlled on hctz and nifediine     -Metabolic/Endocrine: no hx of DM or CKD stage 3 or above    -Neurologic: no hx of CVA    Functional Capacity:     > 4 mets: able to climb stairs and walk 1-2 blocks w/o stopping    Procedure Risk:     Low - capsulotomy     Presently Clinically Stable for Scheduled Surgery. No contraindications to planned surgery     Anthony Cottrell MD

## 2021-06-13 NOTE — PROGRESS NOTES
"Jonathan Blackwell is a 80 y.o. male who is being evaluated via a billable telephone visit.      The patient has been notified of following:     \"This telephone visit will be conducted via a call between you and your physician/provider. We have found that certain health care needs can be provided without the need for a physical exam.  This service lets us provide the care you need with a short phone conversation.  If a prescription is necessary we can send it directly to your pharmacy.  If lab work is needed we can place an order for that and you can then stop by our lab to have the test done at a later time.    Telephone visits are billed at different rates depending on your insurance coverage. During this emergency period, for some insurers they may be billed the same as an in-person visit.  Please reach out to your insurance provider with any questions.    If during the course of the call the physician/provider feels a telephone visit is not appropriate, you will not be charged for this service.\"    Patient has given verbal consent to a Telephone visit? Yes    What phone number would you like to be contacted at? [phone]    Patient would like to receive their AVS by AVS Preference: Mail a copy.    Additional provider notes: Jonathan is a pleasant 80-year-old gentleman is  to Ines will we had a telephone visit today regarding COVID-19 symptomatology.  Jonathan is having a headache and a runny nose and is feeling very fatigued.  They have not had direct contact with anybody COVID-19 positive except for their neighbors who they said they have not socialized with for couple of weeks but have contacted them on the telephone.  Her son-in-law is a physician and he recommends that both parents get tested and I agree wholeheartedly with him.  The do have risk factors that make him at higher risk for other sequelae of COVID-19.  An appropriate test will be placed today and quarantining will be ordered online till test " comes back negative    Assessment/Plan:  1. Suspected 2019 novel coronavirus infection    - Symptomatic COVID-19 Virus (CORONAVIRUS) PCR; Future        Phone call duration:  11 minutes    Giles Norton MA

## 2021-06-13 NOTE — TELEPHONE ENCOUNTER
Refill Approved    Rx renewed per Medication Renewal Policy. Medication was last renewed on 3/5/2020.  OV 3/5/2020  Edelmira Angulo, [facility] Triage/Med Refill 11/30/2020     Requested Prescriptions   Pending Prescriptions Disp Refills     losartan-hydrochlorothiazide (HYZAAR) 50-12.5 mg per tablet [Pharmacy Med Name: Losartan Potassium-HCTZ Oral Tablet 50-12.5 MG] 90 tablet 0     Sig: TAKE ONE TABLET BY MOUTH ONE TIME DAILY       Diuretics/Combination Diuretics Refill Protocol  Passed - 11/28/2020 10:14 AM        Passed - Visit with PCP or prescribing provider visit in past 12 months     Last office visit with prescriber/PCP: 3/5/2020 Corie Spears MD OR same dept: 3/5/2020 Corie Spears MD OR same specialty: 3/5/2020 Corie Spears MD  Last physical: 12/19/2018 Last MTM visit: Visit date not found   Next visit within 3 mo: Visit date not found  Next physical within 3 mo: Visit date not found  Prescriber OR PCP: Corie Spears MD  Last diagnosis associated with med order: 1. BP - high blood pressure  - losartan-hydrochlorothiazide (HYZAAR) 50-12.5 mg per tablet [Pharmacy Med Name: Losartan Potassium-HCTZ Oral Tablet 50-12.5 MG]; TAKE ONE TABLET BY MOUTH ONE TIME DAILY   Dispense: 90 tablet; Refill: 0    If protocol passes may refill for 12 months if within 3 months of last provider visit (or a total of 15 months).             Passed - Serum Potassium in past 12 months      Lab Results   Component Value Date    Potassium 4.7 03/05/2020             Passed - Serum Sodium in past 12 months      Lab Results   Component Value Date    Sodium 133 (L) 03/05/2020             Passed - Blood pressure on file in past 12 months     BP Readings from Last 1 Encounters:   03/05/20 120/76             Passed - Serum Creatinine in past 12 months      Creatinine   Date Value Ref Range Status   03/05/2020 1.06 0.70 - 1.30 mg/dL Final

## 2021-06-13 NOTE — TELEPHONE ENCOUNTER
Coronavirus (COVID-19) Notification    Lab Result   Lab test 2019-nCoV rRt-PCR OR SARS-COV-2 PCR    Nasopharyngeal AND/OR Oropharyngeal swab is NEGATIVE for 2019-nCoV RNA [OR] SARS-COV-2 RNA (COVID-19) RNA    Your result was negative. This means that we didn't find the virus that causes COVID-19 in your sample. A test may show negative when you do actually have the virus. This can happen when the virus is in the early stages of infection, before you feel illness symptoms.    If you have symptoms   Stay home and away from others (self-isolate) until you meet ALL of the guidelines below:    You've had no fever--and no medicine that reduces fever--for 1 full day (24 hours). And      Your other symptoms have gotten better. For example, your cough or breathing has improved. And     At least 10 days have passed since your symptoms started. (If you ve been told by a doctor that you have a weak immune system, wait 20 days.)     During this time:    Stay home. Don't go to work, school or anywhere else.     Stay in your own room, including for meals. Use your own bathroom if you can.    Stay away from others in your home. No hugging, kissing or shaking hands. No visitors.    Clean  high touch  surfaces often (doorknobs, counters, handles, etc.). Use a household cleaning spray or wipes. You can find a full list on the EPA website at www.epa.gov/pesticide-registration/list-n-disinfectants-use-against-sars-cov-2.    Cover your mouth and nose with a mask, tissue or other face covering to avoid spreading germs.    Wash your hands and face often with soap and water.    Going back to work  Check with your employer for any guidelines to follow for going back to work.  You are sent a letter for your Employer which will serve as formal document notice that you, the employee, tested negative for COVID-19, as of the testing date shown above.    If your symptoms worsen or other concerning symptoms, contact PCP, oncare or consider  returning to Emergency Dept.    Where can I get more information?    [facility]: [email or web address]    Coronavirus Basics: [email or web address]    [facility] Hotline (107-406-4368)    Ivana Villar RN

## 2021-06-15 NOTE — TELEPHONE ENCOUNTER
Refill Approved    Rx renewed per Medication Renewal Policy. Medication was last renewed on 11/30/2020.  Last office visit was 12/14/2020 with Dr Loya. PCP office.    Libia Lyn, [facility] Triage/Med Refill 3/2/2021     Requested Prescriptions   Pending Prescriptions Disp Refills     losartan-hydrochlorothiazide (HYZAAR) 50-12.5 mg per tablet [Pharmacy Med Name: Losartan Potassium-HCTZ Oral Tablet 50-12.5 MG] 90 tablet 0     Sig: TAKE ONE TABLET BY MOUTH ONE TIME DAILY       Diuretics/Combination Diuretics Refill Protocol  Passed - 3/1/2021 11:19 AM        Passed - Visit with PCP or prescribing provider visit in past 12 months     Last office visit with prescriber/PCP: 3/5/2020 Corie Spears MD OR same dept: 3/5/2020 Corie Spears MD OR same specialty: 3/5/2020 Corie Spears MD  Last physical: 12/19/2018 Last MTM visit: Visit date not found   Next visit within 3 mo: Visit date not found  Next physical within 3 mo: Visit date not found  Prescriber OR PCP: Corie Spears MD  Last diagnosis associated with med order: 1. BP - high blood pressure  - losartan-hydrochlorothiazide (HYZAAR) 50-12.5 mg per tablet [Pharmacy Med Name: Losartan Potassium-HCTZ Oral Tablet 50-12.5 MG]; TAKE ONE TABLET BY MOUTH ONE TIME DAILY   Dispense: 90 tablet; Refill: 0    If protocol passes may refill for 12 months if within 3 months of last provider visit (or a total of 15 months).             Passed - Serum Potassium in past 12 months      Lab Results   Component Value Date    Potassium 4.7 03/05/2020             Passed - Serum Sodium in past 12 months      Lab Results   Component Value Date    Sodium 133 (L) 03/05/2020             Passed - Blood pressure on file in past 12 months     BP Readings from Last 1 Encounters:   03/05/20 120/76             Passed - Serum Creatinine in past 12 months      Creatinine   Date Value Ref Range Status   03/05/2020 1.06 0.70 - 1.30 mg/dL Final

## 2021-06-16 NOTE — TELEPHONE ENCOUNTER
RN cannot approve Refill Request    RN can NOT refill this medication PCP messaged that patient is overdue for Office Visit. Last office visit: 3/5/2020 Corie Spears MD Last Physical: 12/19/2018 Last MTM visit: Visit date not found Last visit same specialty: 3/5/2020 Corie Spears MD.  Next visit within 3 mo: Visit date not found  Next physical within 3 mo: Visit date not found      Deanna Chatterjee, [facility] Triage/Med Refill 4/20/2021    Requested Prescriptions   Pending Prescriptions Disp Refills     lovastatin (MEVACOR) 40 MG tablet [Pharmacy Med Name: Lovastatin Oral Tablet 40 MG] 45 tablet 0     Sig: TAKE HALF TABLET BY MOUTH AT BEDTIME       Statins Refill Protocol (Hmg CoA Reductase Inhibitors) Failed - 4/20/2021 12:40 PM        Failed - PCP or prescribing provider visit in past 12 months      Last office visit with prescriber/PCP: 3/5/2020 Corie Spears MD OR same dept: Visit date not found OR same specialty: 3/5/2020 Corie Spears MD  Last physical: 12/19/2018 Last MTM visit: Visit date not found   Next visit within 3 mo: Visit date not found  Next physical within 3 mo: Visit date not found  Prescriber OR PCP: Corie Spears MD  Last diagnosis associated with med order: 1. Hyperlipidemia  - lovastatin (MEVACOR) 40 MG tablet [Pharmacy Med Name: Lovastatin Oral Tablet 40 MG]; TAKE HALF TABLET BY MOUTH AT BEDTIME   Dispense: 45 tablet; Refill: 0    If protocol passes may refill for 12 months if within 3 months of last provider visit (or a total of 15 months).

## 2021-06-16 NOTE — TELEPHONE ENCOUNTER
RN cannot approve Refill Request    RN can NOT refill this medication PCP messaged that patient is overdue for Office Visit. Last office visit: 3/5/2020 Corie Spears MD Last Physical: 12/19/2018 Last MTM visit: Visit date not found Last visit same specialty: 3/5/2020 Corie Spears MD.  Next visit within 3 mo: Visit date not found  Next physical within 3 mo: Visit date not found      Shira Mera, [facility] Triage/Med Refill 3/29/2021    Requested Prescriptions   Pending Prescriptions Disp Refills     tamsulosin (FLOMAX) 0.4 mg cap [Pharmacy Med Name: Tamsulosin HCl Oral Capsule 0.4 MG] 90 capsule 0     Sig: TAKE ONE CAPSULE BY MOUTH AT BEDTIME       Alfuzosin/Tamsulosin/Silodosin Refill Protocol  Failed - 3/29/2021 10:09 AM        Failed - PCP or prescribing provider visit in past 12 months       Last office visit with prescriber/PCP: 3/5/2020 Corie Spears MD OR same dept: Visit date not found OR same specialty: 3/5/2020 Corie Spears MD  Last physical: 12/19/2018 Last MTM visit: Visit date not found   Next visit within 3 mo: Visit date not found  Next physical within 3 mo: Visit date not found  Prescriber OR PCP: Corie Spears MD  Last diagnosis associated with med order: 1. HISTORY of urinary retention  - tamsulosin (FLOMAX) 0.4 mg cap [Pharmacy Med Name: Tamsulosin HCl Oral Capsule 0.4 MG]; TAKE ONE CAPSULE BY MOUTH AT BEDTIME   Dispense: 90 capsule; Refill: 0    If protocol passes may refill for 12 months if within 3 months of last provider visit (or a total of 15 months).

## 2021-06-16 NOTE — TELEPHONE ENCOUNTER
Left detailed message for patient that medication has been sent. Did let him know he is due for a physical and to call back to schedule.

## 2021-06-17 ENCOUNTER — COMMUNICATION - HEALTHEAST (OUTPATIENT)
Dept: INTERNAL MEDICINE | Facility: CLINIC | Age: 81
End: 2021-06-17

## 2021-06-17 ENCOUNTER — OFFICE VISIT - HEALTHEAST (OUTPATIENT)
Dept: FAMILY MEDICINE | Facility: CLINIC | Age: 81
End: 2021-06-17

## 2021-06-17 DIAGNOSIS — M25.561 PAIN IN RIGHT KNEE: ICD-10-CM

## 2021-06-17 DIAGNOSIS — M16.0 OSTEOARTHRITIS OF BOTH HIPS, UNSPECIFIED OSTEOARTHRITIS TYPE: ICD-10-CM

## 2021-06-17 DIAGNOSIS — M25.561 ACUTE PAIN OF RIGHT KNEE: ICD-10-CM

## 2021-06-17 DIAGNOSIS — M25.559 HIP PAIN, ACUTE, UNSPECIFIED LATERALITY: ICD-10-CM

## 2021-06-17 DIAGNOSIS — M25.559 PAIN IN UNSPECIFIED HIP: ICD-10-CM

## 2021-06-17 NOTE — TELEPHONE ENCOUNTER
RN cannot approve Refill Request    RN can NOT refill this medication PCP messaged that patient is overdue for Office Visit. Last office visit: Visit date not found Last Physical: Visit date not found Last MTM visit: Visit date not found Last visit same specialty: 3/5/2020 Corie Spears MD.  Next visit within 3 mo: Visit date not found  Next physical within 3 mo: Visit date not found      Shira ELISABETH Mera, [facility] Triage/Med Refill 5/10/2021    Requested Prescriptions   Pending Prescriptions Disp Refills     tamsulosin (FLOMAX) 0.4 mg cap [Pharmacy Med Name: Tamsulosin HCl Oral Capsule 0.4 MG] 30 capsule 0     Sig: TAKE ONE CAPSULE BY MOUTH AT BEDTIME       Alfuzosin/Tamsulosin/Silodosin Refill Protocol  Failed - 5/10/2021  9:40 AM        Failed - PCP or prescribing provider visit in past 12 months       Last office visit with prescriber/PCP: Visit date not found OR same dept: Visit date not found OR same specialty: 3/5/2020 Corie Spears MD  Last physical: Visit date not found Last MTM visit: Visit date not found   Next visit within 3 mo: Visit date not found  Next physical within 3 mo: Visit date not found  Prescriber OR PCP: Debbie Chao DO  Last diagnosis associated with med order: 1. HISTORY of urinary retention  - tamsulosin (FLOMAX) 0.4 mg cap [Pharmacy Med Name: Tamsulosin HCl Oral Capsule 0.4 MG]; TAKE ONE CAPSULE BY MOUTH AT BEDTIME   Dispense: 30 capsule; Refill: 0    If protocol passes may refill for 12 months if within 3 months of last provider visit (or a total of 15 months).

## 2021-06-17 NOTE — TELEPHONE ENCOUNTER
RN cannot approve Refill Request    RN can NOT refill this medication PCP messaged that patient is overdue for Office Visit. Last office visit: 3/5/2020 Corie Spears MD Last Physical: 12/19/2018 Last MTM visit: Visit date not found Last visit same specialty: 3/5/2020 Corie Spears MD.  Next visit within 3 mo: Visit date not found  Next physical within 3 mo: Visit date not found      Shira Mera, [facility] Triage/Med Refill 5/10/2021    Requested Prescriptions   Pending Prescriptions Disp Refills     tamsulosin (FLOMAX) 0.4 mg cap [Pharmacy Med Name: Tamsulosin HCl Oral Capsule 0.4 MG] 90 capsule 0     Sig: TAKE ONE CAPSULE BY MOUTH AT BEDTIME       Alfuzosin/Tamsulosin/Silodosin Refill Protocol  Failed - 5/10/2021  5:29 PM        Failed - PCP or prescribing provider visit in past 12 months       Last office visit with prescriber/PCP: 3/5/2020 Corie Spears MD OR same dept: Visit date not found OR same specialty: 3/5/2020 Corie Spears MD  Last physical: 12/19/2018 Last MTM visit: Visit date not found   Next visit within 3 mo: Visit date not found  Next physical within 3 mo: Visit date not found  Prescriber OR PCP: Corie Spears MD  Last diagnosis associated with med order: 1. HISTORY of urinary retention  - tamsulosin (FLOMAX) 0.4 mg cap [Pharmacy Med Name: Tamsulosin HCl Oral Capsule 0.4 MG]; TAKE ONE CAPSULE BY MOUTH AT BEDTIME   Dispense: 90 capsule; Refill: 0    If protocol passes may refill for 12 months if within 3 months of last provider visit (or a total of 15 months).

## 2021-06-17 NOTE — PROGRESS NOTES
Nhung Blackwell,    Your result is/are normal.  Please continue your current treatment plan.  Continue current medications if on any.  Call if any questions or concerns.    Corie Spears MD

## 2021-06-18 NOTE — PATIENT INSTRUCTIONS - HE
Patient Instructions by Corie Spears MD at 3/5/2020 10:00 AM     Author: Corie Spears MD Service: -- Author Type: Physician    Filed: 3/5/2020 10:13 AM Encounter Date: 3/5/2020 Status: Signed    : Corie Spears MD (Physician)         Patient Education     Your Health Risk Assessment indicates you feel you are not in good physical health.    A healthy lifestyle helps keep the body fit and the mind alert. It helps protect you from disease, helps you fight disease, and helps prevent chronic disease (disease that doesn't go away) from getting worse. This is important as you get older and begin to notice twinges in muscles and joints and a decline in the strength and stamina you once took for granted. A healthy lifestyle includes good healthcare, good nutrition, weight control, recreation, and regular exercise. Avoid harmful substances and do what you can to keep safe. Another part of a healthy lifestyle is stay mentally active and socially involved.    Good healthcare     Have a wellness visit every year.     If you have new symptoms, let us know right away. Don't wait until the next checkup.     Take medicines exactly as prescribed and keep your medicines in a safe place. Tell us if your medicine causes problems.   Healthy diet and weight control     Eat 3 or 4 small, nutritious, low-fat, high-fiber meals a day. Include a variety of fruits, vegetables, and whole-grain foods.     Make sure you get enough calcium in your diet. Calcium, vitamin D, and exercise help prevent osteoporosis (bone thinning).     If you live alone, try eating with others when you can. That way you get a good meal and have company while you eat it.     Try to keep a healthy weight. If you eat more calories than your body uses for energy, it will be stored as fat and you will gain weight.     Recreation   Recreation is not limited to sports and team events. It includes any activity that provides relaxation, interest, enjoyment,  and exercise. Recreation provides an outlet for physical, mental, and social energy. It can give a sense of worth and achievement. It can help you stay healthy.       Patient Education   Alcohol Use   Many people can enjoy a glass of wine or beer without any negative consequences to their health. According to the Centers for Disease Control and Prevention (CDC), having one or fewer drinks per day for women and two or fewer per day for men is considered moderate drinking.     When people drink more than moderately, it can become concerning. Excessive drinking is defined as consuming 15 drinks or more per week for men and 8 drinks or more per week for women. There are various health problems associated with excessive drinking, which include:    Damage to vital organs like the heart, brain, liver and pancreas    Harm to the digestive tract    Weaken the immune system    Higher risk for heart disease and cancer       Patient Education   Understanding [employer] MyPlate  The USDA (US Department of Agriculture) has guidelines to help you make healthy food choices. These are called MyPlate. MyPlate shows the food groups that make up healthy meals using the image of a place setting. Before you eat, think about the healthiest choices for what to put onto your plate or into your cup or bowl. To learn more about building a healthy plate, visit www.choosemyplate.gov.       The Food Groups    Fruits: Any fruit or 100% fruit juice counts as part of the Fruit Group. Fruits may be fresh, canned, frozen, or dried, and may be whole, cut-up, or pureed. Make half your plate fruits and vegetables.    Vegetables: Any vegetable or 100% vegetable juice counts as a member of the Vegetable Group. Vegetables may be fresh, frozen, canned, or dried. They can be served raw or cooked and may be whole, cut-up, or mashed. Make half your plate fruits and vegetables.     Grains: All foods made from grains are part of the Grains Group. These include wheat,  rice, oats, cornmeal, and barley such as bread, pasta, oatmeal, cereal, tortillas, and grits. Grains should be no more than a quarter of your plate. At least half of your grains should be whole grains.    Protein: This group includes meat, poultry, seafood, beans and peas, eggs, processed soy products (like tofu), nuts (including nut butters), and seeds. Make protein choices no more than a quarter of your plate. Meat and poultry choices should be lean or low fat.    Dairy: All fluid milk products and foods made from milk that contain calcium, like yogurt and cheese are part of the Dairy Group. (Foods that have little calcium, such as cream, butter, and cream cheese, are not part of the group.) Most dairy choices should be low-fat or fat-free.    Oils: These are fats that are liquid at room temperature. They include canola, corn, olive, soybean, and sunflower oil. Foods that are mainly oil include mayonnaise, certain salad dressings, and soft margarines. You should have only 5 to 7 teaspoons of oils a day. You probably already get this much from the food you eat.  Use [employer] to Help Build Your Meals  The SuperTracker can help you plan and track your meals and activity. You can look up individual foods to see or compare their nutritional value. You can get guidelines for what and how much you should eat. You can compare your food choices. And you can assess personal physical activities and see ways you can improve. Go to www.chooseipadioplate.gov/supertracker/.    0407-6970 [employer]. [address] [place] [address], [place] [address]. All rights reserved. This information is not intended as a substitute for professional medical care. Always follow your healthcare professional's instructions.             Advance Directive  Patients advance directive was discussed and I am comfortable with the patients wishes.  Patient Education   Personalized Prevention Plan  You are due for the preventive services outlined  below.  Your care team is available to assist you in scheduling these services.  If you have already completed any of these items, please share that information with your care team to update in your medical record.  Health Maintenance   Topic Date Due   ? ZOSTER VACCINES (2 of 3) 10/03/2016   ? INFLUENZA VACCINE RULE BASED (1) 08/01/2019   ? MEDICARE ANNUAL WELLNESS VISIT  12/19/2019   ? FALL RISK ASSESSMENT  12/19/2019   ? TD 18+ HE  11/13/2024   ? ADVANCE CARE PLANNING  03/02/2025   ? PNEUMOCOCCAL IMMUNIZATION 65+ LOW/MEDIUM RISK  Completed

## 2021-06-20 NOTE — LETTER
Letter by Corie Spears MD at      Author: Corie Spears MD Service: -- Author Type: --    Filed:  Encounter Date: 3/6/2020 Status: (Other)         Jonathan Blackwell  [address] [place] [address]             March 6, 2020         Dear Mr. Blackwell,    Below are the results from your recent visit:    Resulted Orders   Basic Metabolic Panel   Result Value Ref Range    Sodium 133 (L) 136 - 145 mmol/L    Potassium 4.7 3.5 - 5.0 mmol/L    Chloride 97 (L) 98 - 107 mmol/L    CO2 25 22 - 31 mmol/L    Anion Gap, Calculation 11 5 - 18 mmol/L    Glucose 97 70 - 125 mg/dL    Calcium 9.8 8.5 - 10.5 mg/dL    BUN 13 8 - 28 mg/dL    Creatinine 1.06 0.70 - 1.30 mg/dL    GFR MDRD Af Amer >60 >60 mL/min/1.73m2    GFR MDRD Non Af Amer >60 >60 mL/min/1.73m2    Narrative    Fasting Glucose reference range is 70-99 mg/dL per  American Diabetes Association (ADA) guidelines.   Lipid Cascade RANDOM   Result Value Ref Range    Cholesterol 135 <=199 mg/dL    Triglycerides 91 <=149 mg/dL    HDL Cholesterol 51 >=40 mg/dL    LDL Calculated 66 <=129 mg/dL    Patient Fasting > 8hrs? Unknown    PSA, Annual Screen (Prostatic-Specific Antigen)   Result Value Ref Range    PSA 0.3 0.0 - 6.5 ng/mL    Narrative    Method is Abbott Prostate-Specific Antigen (PSA)  Standard-WHO 1st International (90:10)   HM1 (CBC with Diff)   Result Value Ref Range    WBC 6.4 4.0 - 11.0 thou/uL    RBC 4.61 4.40 - 6.20 mill/uL    Hemoglobin 14.6 14.0 - 18.0 g/dL    Hematocrit 43.1 40.0 - 54.0 %    MCV 94 80 - 100 fL    MCH 31.6 27.0 - 34.0 pg    MCHC 33.8 32.0 - 36.0 g/dL    RDW 11.6 11.0 - 14.5 %    Platelets 221 140 - 440 thou/uL    MPV 9.3 7.0 - 10.0 fL    Neutrophils % 60 50 - 70 %    Lymphocytes % 28 20 - 40 %    Monocytes % 9 2 - 10 %    Eosinophils % 2 0 - 6 %    Basophils % 1 0 - 2 %    Neutrophils Absolute 3.9 2.0 - 7.7 thou/uL    Lymphocytes Absolute 1.8 0.8 - 4.4 thou/uL    Monocytes Absolute 0.6 0.0 - 0.9 thou/uL    Eosinophils Absolute 0.2  0.0 - 0.4 thou/uL    Basophils Absolute 0.1 0.0 - 0.2 thou/uL   Thyroid Cascade   Result Value Ref Range    TSH 2.55 0.30 - 5.00 uIU/mL        Result is/are normal.  Treatment plan to continue as discussed in clinic.  Continue current  medications if on any.  Call if any questions or concerns.     Please call with questions or contact us using [employer].    Sincerely,        Electronically signed by Corie Spears MD

## 2021-06-20 NOTE — LETTER
Letter by Corie Spears MD at      Author: Corie Spears MD Service: -- Author Type: --    Filed:  Encounter Date: 2/24/2020 Status: (Other)         Jonathan Blackwell  [address] [place] [address]      02/24/20      Dear Jonathan,      In reviewing your records, we have determined a medication check is needed before your next refill, please call the [facility] to schedule an appointment.      Medication check/review      We have made attempts to call you for an appointment, please verify your contact information is correct when calling back for an appointment, or if you have transferred your care to another clinic, please contact us so we can update our records.     Please call [phone] to schedule an appointment.    We believe that a strong preventative care program, including regular physicals and follow-up care is an important part of a healthy lifestyle and we are committed to helping you maintain your health.    Thank you for choosing us as your health care provider.    Sincerely,    Rhonda Daniels  [facility]  [address] [id number] [phone]

## 2021-06-21 NOTE — LETTER
Letter by Corei Spears MD at      Author: Corie Spears MD Service: -- Author Type: --    Filed:  Encounter Date: 5/20/2021 Status: (Other)         Jonathan Blackwell  [address] [place] [address]             May 20, 2021         Dear Mr. Blackwell,    Below are the results from your recent visit:    Resulted Orders   Comprehensive Metabolic Panel   Result Value Ref Range    Sodium 135 (L) 136 - 145 mmol/L    Potassium 5.0 3.5 - 5.0 mmol/L    Chloride 99 98 - 107 mmol/L    CO2 25 22 - 31 mmol/L    Anion Gap, Calculation 11 5 - 18 mmol/L    Glucose 103 70 - 125 mg/dL    BUN 20 8 - 28 mg/dL    Creatinine 0.94 0.70 - 1.30 mg/dL    GFR MDRD Af Amer >60 >60 mL/min/1.73m2    GFR MDRD Non Af Amer >60 >60 mL/min/1.73m2    Bilirubin, Total 0.5 0.0 - 1.0 mg/dL    Calcium 8.9 8.5 - 10.5 mg/dL    Protein, Total 6.7 6.0 - 8.0 g/dL    Albumin 3.5 3.5 - 5.0 g/dL    Alkaline Phosphatase 46 45 - 120 U/L    AST 24 0 - 40 U/L    ALT 18 0 - 45 U/L    Narrative    Fasting Glucose reference range is 70-99 mg/dL per  American Diabetes Association (ADA) guidelines.   Lipid Cascade RANDOM   Result Value Ref Range    Cholesterol 135 <=199 mg/dL    Triglycerides 103 <=149 mg/dL    HDL Cholesterol 46 >=40 mg/dL    LDL Calculated 68 <=129 mg/dL    Patient Fasting > 8hrs? No    HM1 (CBC with Diff)   Result Value Ref Range    WBC 6.6 4.0 - 11.0 thou/uL    RBC 4.30 (L) 4.40 - 6.20 mill/uL    Hemoglobin 13.4 (L) 14.0 - 18.0 g/dL    Hematocrit 40.5 40.0 - 54.0 %    MCV 94 80 - 100 fL    MCH 31.2 27.0 - 34.0 pg    MCHC 33.1 32.0 - 36.0 g/dL    RDW 13.0 11.0 - 14.5 %    Platelets 298 140 - 440 thou/uL    MPV 9.6 7.0 - 10.0 fL    Neutrophils % 53 50 - 70 %    Lymphocytes % 31 20 - 40 %    Monocytes % 12 (H) 2 - 10 %    Eosinophils % 3 0 - 6 %    Basophils % 1 0 - 2 %    Immature Granulocyte % 0 <=0 %    Neutrophils Absolute 3.5 2.0 - 7.7 thou/uL    Lymphocytes Absolute 2.0 0.8 - 4.4 thou/uL    Monocytes Absolute 0.8 0.0 - 0.9 thou/uL     Eosinophils Absolute 0.2 0.0 - 0.4 thou/uL    Basophils Absolute 0.1 0.0 - 0.2 thou/uL    Immature Granulocyte Absolute 0.0 <=0.0 thou/uL       Nhung Blackwell,     Your result is/are normal.  Please continue your current treatment plan.  Continue current medications if on any.  Call if any questions or concerns.     Please call with questions or contact us using [employer].    Sincerely,        Electronically signed by Corie Spears MD

## 2021-06-22 NOTE — PROGRESS NOTES
As of 12/14/2016  Bone Mass Measurements covered once every 2 years and  1) women determined to be estrogen deficient or at risk for osteoporosis  2) individuals with vertebral abnormalities or primary hyperparathyroidism    Cardiovascular Disease Screening  covered once every 5 years for all asymptomatic beneficiaries    Colorectal Cancer Screening Normal risk patients, aged 50+ covered for:   screening FOBT every year  screening flex sig once every 4 years (or 10 years after colonoscopy)  screening colonoscopy once every 10 years (or 4 years after flex sig)  High risk patients, aged 50+:  screening FOBT every year  screening flex sig once every 4 years  screening colonoscopy once every 2 years (or 4 years after flex sig)   Diabetes Screening covered once per year for beneficiaries with certain risk factors for diabetes & not previously diagnosed with pre-diabetes   covered twice a year if diagnosed with pre-diabetes   Pneumococcal Vaccine/Admin all beneficiaries   Prostate Cancer Screening covered annually for all male patients aged 50+    Screening Mammography covered annually for patients aged 40+   Screening PapTest/Pelvic Exam covered annually if high risk, once every 2 years if normal risk    Screening Ultrasound for   Abnormal Aortic Aneurysm (AAA) covered once a lifetime for patients with certain risk factors for AAA   Hep B Screening covered for patients at intermediate or high risk for chiki Hep B     Other Preventive Services Medicare Covers    Alcohol Misuse Screening Counseling    Counseling to Prevent Tobacco Use (see Charge section)    Depression Screening    Diabetes Self-Management Training (DSMT)    Glaucoma Screening    Hepatitis C Virus (HCV) Screening    Human Immunodeficiency Virus (HIV) Screening    Influenza, Pneumococcal and Hep B vaccines    Intensive Behavioral Therapy (IBT) for Cardiovascular Disease    IBT for Obesity    Medical Nutrition Therapy (MNT)    Screening for Sexually  Transmitted Infections, Screening  Assessment and Plan:       1. Routine general medical examination at a health care facility  - [facility](CBC and Differential); Future  - Lipid Cascade RANDOM; Future  - Comprehensive Metabolic Panel; Future  - PSA, Annual Screen (Prostatic-Specific Antigen); Future  Recommend follow up with pharmacy about getting shingrix vaccination    2. Hypercholesteremia  Continue with lovastatin and will check labs    3. BP - high blood pressure  Stable.  Would like to stop the nifedipine due to copay.  Switched to losartan - HCTZ and instructed to check bp twice daily for the next week.  Labs in 1 week and will call to check on bp.  If any low bp call the office immediately or go to the ER    4. Pain in joint of left shoulder  Stable and continue to monitor    5. Encounter for screening for malignant neoplasm of prostate   Routine labs  - PSA, Annual Screen (Prostatic-Specific Antigen); Future        The patient's current medical problems were reviewed.    I have had an Advance Directives discussion with the patient.  The following health maintenance schedule was reviewed with the patient and provided in printed form in the after visit summary:   Health Maintenance   Topic Date Due     ZOSTER VACCINES (1 of 2) 08/25/1988     FALL RISK ASSESSMENT  07/08/2017     ADVANCE DIRECTIVES DISCUSSED WITH PATIENT  01/08/2021     TD 18+ HE  11/13/2024     PNEUMOCOCCAL POLYSACCHARIDE VACCINE AGE 65 AND OVER  Completed     INFLUENZA VACCINE RULE BASED  Completed     PNEUMOCOCCAL CONJUGATE VACCINE FOR ADULTS (PCV13 OR PREVNAR)  Completed        Subjective:   Chief Complaint: Jonathan Blackwell is an 78 y.o. male here for an Annual Wellness visit.   HPI:  Pt here to establish care.  Hx of HTN and HLD well controlled on medication.  No cp or shortness of breath.  Would like to change medication simply due to cost.  Hx of shoulder surgery but no issues with it since then.  Hx of bph but well controlled with  "medications.    Review of Systems:   Please see above.  The rest of the review of systems are negative for all systems.    Patient Care Team:  Corie Spears MD as PCP - General (Family Medicine)     Patient Active Problem List   Diagnosis     Benign Adenomatous Polyp Of The Large Intestine     Vitamin D Deficiency     Hypercholesteremia     Obesity     Benign Prostatic Hypertrophy With Urinary Obstruction     BP - high blood pressure     Joint Pain, Localized In The Left Shoulder     S/P shoulder replacement     HISTORY of urinary retention     Constipation     Edema     Sweating heavily at night     ARMD (age related macular degeneration) - \"very minor\" he says.     Past Medical History:   Diagnosis Date     Anemia 10/9/2014     BPH (benign prostatic hyperplasia)      Hyperlipidemia      Hypertension       Past Surgical History:   Procedure Laterality Date     CT APPENDECTOMY      Description: Appendectomy;  Recorded: 11/13/2014;     CT LAP,CHOLECYSTECTOMY      Description: Cholecystectomy Laparoscopic;  Recorded: 11/13/2014;     CT RECONSTR TOTAL SHOULDER IMPLANT Left 10/9/2014    Procedure: SHOULDER TOTAL REPLACEMENT ;  Surgeon: Binu Rodriguez MD;  Location: [facility];  Service: Orthopedics     CT RECONSTR TOTAL SHOULDER IMPLANT      Description: Shoulder Arthroplasty Total Shoulder Replacement;  Recorded: 11/13/2014;  Comments: LEFT:   9 OCT 2014      Family History   Problem Relation Age of Onset     Other Mother         Cause of death unclear to him.     Dementia Father         \"Might have had a little bit of Alzheimer's coming on.\"     Other Father         Cause of death unclear to him.     Aneurysm Brother         SAH from brain aneurysm.     Diabetes Maternal Grandmother      No Medical Problems Daughter      No Medical Problems Son       Social History     Socioeconomic History     Marital status:      Spouse name: Not on file     Number of children: Not on file     Years of education: Not " "on file     Highest education level: Not on file   Social Needs     Financial resource strain: Not on file     Food insecurity - worry: Not on file     Food insecurity - inability: Not on file     Transportation needs - medical: Not on file     Transportation needs - non-medical: Not on file   Occupational History     Not on file   Tobacco Use     Smoking status: Former Smoker     Last attempt to quit: 1995     Years since quittin.4     Smokeless tobacco: Never Used     Tobacco comment: Quit date was in .  Precise date is nominal.   Substance and Sexual Activity     Alcohol use: Yes     Alcohol/week: 0.6 oz     Types: 1 Cans of beer per week     Comment: Beer when golfing.  A little wine.  Does not drink regularly.     Drug use: No     Sexual activity: Not on file   Other Topics Concern     Not on file   Social History Narrative     Not on file      Current Outpatient Medications   Medication Sig Dispense Refill     amoxicillin (AMOXIL) 500 MG tablet TAKE 4 TABLETS ONE HOUR BEFORE DENTAL APPOINTMENT 12 tablet 0     aspirin 81 MG EC tablet Take 81 mg by mouth 3 (three) times a week.       cholecalciferol, vitamin D3, 1,000 unit tablet Take 2,000 Units by mouth daily.       hydroCHLOROthiazide (HYDRODIURIL) 25 MG tablet TAKE 1 TABLET BY MOUTH DAILY 90 tablet 0     lovastatin (MEVACOR) 40 MG tablet TAKE 1/2 TABLET(20 MG) BY MOUTH EVERY NIGHT AT BEDTIME 45 tablet 2     MULTIVITAMIN ORAL Take 1 tablet by mouth 3 (three) times a week.        NIFEdipine (ADALAT CC) 60 MG 24 hr tablet TAKE 1 TABLET BY MOUTH DAILY 90 tablet 3     tamsulosin (FLOMAX) 0.4 mg Cp24 TAKE ONE CAPSULE BY MOUTH EVERY EVENING AT BEDTIME 90 capsule 0     No current facility-administered medications for this visit.       Objective:   Vital Signs:   Visit Vitals  /74   Pulse 72   Ht 5' 3.5\" (1.613 m)   Wt 194 lb 4.8 oz (88.1 kg)   SpO2 96%   BMI 33.88 kg/m         VisionScreening:  No exam data present     PHYSICAL EXAM    Physical " Exam:  General Appearance: Alert, cooperative, no distress, appears stated age   Head: Normocephalic, without obvious abnormality, atraumatic  Eyes: PERRL, conjunctiva/corneas clear, EOM's intact   Ears: Normal TM's and external ear canals, both ears  Nose:Nares normal, septum midline,mucosa normal, no drainage    Throat:Lips, mucosa, and tongue normal; teeth and gums normal  Neck: Supple, symmetrical, trachea midline, no adenopathy;  thyroid: not enlarged, symmetric, no tenderness/mass/nodules  Back: Symmetric, no curvature, ROM normal,  Lungs: Clear to auscultation bilaterally, respirations unlabored  Heart: Regular rate and rhythm, S1 and S2 normal, no murmur, rub, or gallop  Abdomen: Soft, non-tender, bowel sounds active all four quadrants,  no masses, no organomegaly  Extremities: Extremities normal, atraumatic, no cyanosis or edema  Skin: Skin color, texture, turgor normal, no rashes or lesions  Lymph nodes: Cervical, supraclavicular, and axillary nodes normal  Neurologic: Normal        Assessment Results 12/19/2018   Activities of Daily Living No help needed   Instrumental Activities of Daily Living No help needed   Mini Cog Total Score 5   Some recent data might be hidden     A Mini-Cog score of 0-2 suggests the possibility of dementia, score of 3-5 suggests no dementia    Identified Health Risks:       The patient was provided with suggestions to help him develop a healthy physical lifestyle.   The patient reports that he drinks more than one alcoholic drink per day but denies binge or excessive drinking. He was counseled and given information about possible harmful effects of excessive alcohol intake.  The patient was counseled and encouraged to consider modifying their diet and eating habits. He was provided with information on recommended healthy diet options.  The patient was provided with suggestions to help him develop a healthy emotional lifestyle.   Patient's advanced directive was discussed and I  am comfortable with the patient's wishes.

## 2021-06-23 NOTE — TELEPHONE ENCOUNTER
Refill Approved    Rx renewed per Medication Renewal Policy. Medication was last renewed on 7/17/2017, 1/14/2017.    Lisa Bautista, [facility] Connection Triage/Med Refill 1/14/2019     Requested Prescriptions   Pending Prescriptions Disp Refills     tamsulosin (FLOMAX) 0.4 mg cap 90 capsule 0    Alfuzosin/Tamsulosin/Silodosin Refill Protocol  Passed - 1/14/2019  2:48 PM       Passed - PCP or prescribing provider visit in past 12 months      Last office visit with prescriber/PCP: Visit date not found OR same dept: Visit date not found OR same specialty: Visit date not found  Last physical: 12/19/2018 Last MTM visit: Visit date not found   Next visit within 3 mo: Visit date not found  Next physical within 3 mo: Visit date not found  Prescriber OR PCP: Corie Spears MD  Last diagnosis associated with med order: 1. Hyperlipidemia  - lovastatin (MEVACOR) 40 MG tablet;   Dispense: 45 tablet; Refill: 2    If protocol passes may refill for 12 months if within 3 months of last provider visit (or a total of 15 months).             lovastatin (MEVACOR) 40 MG tablet 45 tablet 2    Statins Refill Protocol (Hmg CoA Reductase Inhibitors) Passed - 1/14/2019  2:48 PM       Passed - PCP or prescribing provider visit in past 12 months     Last office visit with prescriber/PCP: Visit date not found OR same dept: Visit date not found OR same specialty: Visit date not found  Last physical: 12/19/2018 Last MTM visit: Visit date not found   Next visit within 3 mo: Visit date not found  Next physical within 3 mo: Visit date not found  Prescriber OR PCP: Corie Spears MD  Last diagnosis associated with med order: 1. Hyperlipidemia  - lovastatin (MEVACOR) 40 MG tablet;   Dispense: 45 tablet; Refill: 2    If protocol passes may refill for 12 months if within 3 months of last provider visit (or a total of 15 months).

## 2021-06-25 NOTE — TELEPHONE ENCOUNTER
RN cannot approve Refill Request    RN can NOT refill this medication   Patient request early refill. Medication last filled 4/21/21 for qty 45 refill 0. Provider to advise on request.   . Last office visit: 5/19/2021 Corie Spears MD Last Physical: 12/19/2018 Last MTM visit: Visit date not found Last visit same specialty: 5/19/2021 Corie Spears MD.  Next visit within 3 mo: Visit date not found  Next physical within 3 mo: Visit date not found      Eddie Chou, Care Connection Triage/Med Refill 6/9/2021    Requested Prescriptions   Pending Prescriptions Disp Refills     lovastatin (MEVACOR) 40 MG tablet [Pharmacy Med Name: Lovastatin Oral Tablet 40 MG] 45 tablet 0     Sig: TAKE HALF TABLET BY MOUTH AT BEDTIME       Statins Refill Protocol (Hmg CoA Reductase Inhibitors) Passed - 6/8/2021  9:36 AM        Passed - PCP or prescribing provider visit in past 12 months      Last office visit with prescriber/PCP: 5/19/2021 Corie Spears MD OR same dept: 5/19/2021 Corie Spears MD OR same specialty: 5/19/2021 Corie Spears MD  Last physical: 12/19/2018 Last MTM visit: Visit date not found   Next visit within 3 mo: Visit date not found  Next physical within 3 mo: Visit date not found  Prescriber OR PCP: Corie Spears MD  Last diagnosis associated with med order: 1. HISTORY of urinary retention  - tamsulosin (FLOMAX) 0.4 mg cap; TAKE ONE CAPSULE BY MOUTH AT BEDTIME   Dispense: 90 capsule; Refill: 3    2. Hyperlipidemia  - lovastatin (MEVACOR) 40 MG tablet [Pharmacy Med Name: Lovastatin Oral Tablet 40 MG]; TAKE HALF TABLET BY MOUTH AT BEDTIME   Dispense: 45 tablet; Refill: 0    If protocol passes may refill for 12 months if within 3 months of last provider visit (or a total of 15 months).              Signed Prescriptions Disp Refills    tamsulosin (FLOMAX) 0.4 mg cap 90 capsule 3     Sig: TAKE ONE CAPSULE BY MOUTH AT BEDTIME       Alfuzosin/Tamsulosin/Silodosin Refill Protocol  Passed - 6/8/2021  9:36 AM         Passed - PCP or prescribing provider visit in past 12 months       Last office visit with prescriber/PCP: 5/19/2021 Corie Spears MD OR same dept: 5/19/2021 Corie Spears MD OR same specialty: 5/19/2021 Corie Spears MD  Last physical: 12/19/2018 Last MTM visit: Visit date not found   Next visit within 3 mo: Visit date not found  Next physical within 3 mo: Visit date not found  Prescriber OR PCP: Corie Spears MD  Last diagnosis associated with med order: 1. HISTORY of urinary retention  - tamsulosin (FLOMAX) 0.4 mg cap; TAKE ONE CAPSULE BY MOUTH AT BEDTIME   Dispense: 90 capsule; Refill: 3    2. Hyperlipidemia  - lovastatin (MEVACOR) 40 MG tablet [Pharmacy Med Name: Lovastatin Oral Tablet 40 MG]; TAKE HALF TABLET BY MOUTH AT BEDTIME   Dispense: 45 tablet; Refill: 0    If protocol passes may refill for 12 months if within 3 months of last provider visit (or a total of 15 months).

## 2021-06-25 NOTE — TELEPHONE ENCOUNTER
Refill Approved    Rx renewed per Medication Renewal Policy. Medication was last renewed on 5/11/21.    Eddie Chou, Care Connection Triage/Med Refill 6/9/2021     Requested Prescriptions   Pending Prescriptions Disp Refills     tamsulosin (FLOMAX) 0.4 mg cap [Pharmacy Med Name: Tamsulosin HCl Oral Capsule 0.4 MG] 30 capsule 0     Sig: TAKE ONE CAPSULE BY MOUTH AT BEDTIME       Alfuzosin/Tamsulosin/Silodosin Refill Protocol  Passed - 6/8/2021  9:36 AM        Passed - PCP or prescribing provider visit in past 12 months       Last office visit with prescriber/PCP: 5/19/2021 Corie Spears MD OR same dept: 5/19/2021 Corie Spears MD OR same specialty: 5/19/2021 Corie Spears MD  Last physical: 12/19/2018 Last MTM visit: Visit date not found   Next visit within 3 mo: Visit date not found  Next physical within 3 mo: Visit date not found  Prescriber OR PCP: Corie Spears MD  Last diagnosis associated with med order: 1. HISTORY of urinary retention  - tamsulosin (FLOMAX) 0.4 mg cap [Pharmacy Med Name: Tamsulosin HCl Oral Capsule 0.4 MG]; TAKE ONE CAPSULE BY MOUTH AT BEDTIME   Dispense: 30 capsule; Refill: 0    2. Hyperlipidemia  - lovastatin (MEVACOR) 40 MG tablet [Pharmacy Med Name: Lovastatin Oral Tablet 40 MG]; TAKE HALF TABLET BY MOUTH AT BEDTIME   Dispense: 45 tablet; Refill: 0    If protocol passes may refill for 12 months if within 3 months of last provider visit (or a total of 15 months).                lovastatin (MEVACOR) 40 MG tablet [Pharmacy Med Name: Lovastatin Oral Tablet 40 MG] 45 tablet 0     Sig: TAKE HALF TABLET BY MOUTH AT BEDTIME       Statins Refill Protocol (Hmg CoA Reductase Inhibitors) Passed - 6/8/2021  9:36 AM        Passed - PCP or prescribing provider visit in past 12 months      Last office visit with prescriber/PCP: 5/19/2021 Corie Spears MD OR same dept: 5/19/2021 Corie Spears MD OR same specialty: 5/19/2021 Corie Spears MD  Last physical: 12/19/2018 Last MTM visit:  Visit date not found   Next visit within 3 mo: Visit date not found  Next physical within 3 mo: Visit date not found  Prescriber OR PCP: Corie Spears MD  Last diagnosis associated with med order: 1. HISTORY of urinary retention  - tamsulosin (FLOMAX) 0.4 mg cap [Pharmacy Med Name: Tamsulosin HCl Oral Capsule 0.4 MG]; TAKE ONE CAPSULE BY MOUTH AT BEDTIME   Dispense: 30 capsule; Refill: 0    2. Hyperlipidemia  - lovastatin (MEVACOR) 40 MG tablet [Pharmacy Med Name: Lovastatin Oral Tablet 40 MG]; TAKE HALF TABLET BY MOUTH AT BEDTIME   Dispense: 45 tablet; Refill: 0    If protocol passes may refill for 12 months if within 3 months of last provider visit (or a total of 15 months).

## 2021-06-26 NOTE — TELEPHONE ENCOUNTER
Please call and see if he is seeing orthopedics?  (I don't have their notes).  Recommend [facility] to evaluate the hip if getting urgently worse.  Otherwise recommend visit with someone tomorrow to check things out

## 2021-06-26 NOTE — TELEPHONE ENCOUNTER
Spoke with patient he stated he can wait until tomorrow. But no available appointments. Did let him know of [facility] hours.

## 2021-06-26 NOTE — TELEPHONE ENCOUNTER
Dr. Spears     Patient said that his right hip pain is getting worse and now the pain is going down his leg.  He is wondering if you want him to get a MRI or if he should get a cortisone injection.  He would like a call back at .

## 2021-06-30 NOTE — PROGRESS NOTES
"Progress Notes by Corie Spears MD at 5/19/2021 12:50 PM     Author: Corie Spears MD Service: -- Author Type: Physician    Filed: 5/19/2021  1:14 PM Encounter Date: 5/19/2021 Status: Signed    : Corie Spears MD (Physician)           Assessment & Plan     Essential hypertension, benign  Routine labs.  No refills needed  - Comprehensive Metabolic Panel  - HM1(CBC and Differential)    Hypercholesteremia  Continue with current medication and will check cholesterol  - Lipid Bertie RANDOM    ARMD (age related macular degeneration) - \"very minor\" he says.  Stable   Seeing ophthalmology          Ordering of each unique test  Prescription drug management  15 minutes spent on the date of the encounter doing chart review, history and exam, documentation and further activities per the note       No follow-ups on file.    Corie Spears MD  [facility]   Jonathan Blackwell is 80 y.o. and presents today for the following health issues   HPI   Stable.  Need med check.          Review of Systems  A review of systems was obtained and is negative other than what is stated in the HPI.         Objective    /80 (Patient Site: Right Arm, Patient Position: Sitting, Cuff Size: Adult Regular)   Pulse 66   Wt 194 lb 9.6 oz (88.3 kg)   SpO2 97%   BMI 33.93 kg/m    Body mass index is 33.93 kg/m .  Physical Exam    General Appearance: Alert and oriented, cooperative, affect appropriate, speech clear, in no apparent distress  Neuro: Alert and oriented, follows commands appropriately.   CV:  RRR S1+S2  Lungs:  CTAB        Corie Spears MD  Family Medicine  [facility]                           "

## 2021-07-02 ENCOUNTER — RECORDS - HEALTHEAST (OUTPATIENT)
Dept: ADMINISTRATIVE | Facility: OTHER | Age: 81
End: 2021-07-02

## 2021-07-04 NOTE — PROGRESS NOTES
Progress Notes by Bailey Ceja CNP at 6/17/2021  3:30 PM     Author: Bailey Ceja CNP Service: -- Author Type: Nurse Practitioner    Filed: 6/17/2021  7:56 PM Encounter Date: 6/17/2021 Status: Signed    : Bailey Ceja CNP (Nurse Practitioner)       Chief Complaint   Patient presents with   ? Pain     knee and hip pain (bilateral side), hip pain 3wks ago (getting worse), knee pain started 1.5wks ago, no known injury       ASSESSMENT & PLAN:   Diagnoses and all orders for this visit:    Osteoarthritis of both hips, unspecified osteoarthritis type  -     Ambulatory referral to Orthopedics    Hip pain, acute, unspecified laterality  -     XR Hip Right 2 or More VWS  -     Ambulatory referral to Orthopedics    Acute pain of right knee  -     XR Knee Right Plus Sunrise VW      Pain in right lateral hip and groin associated with onset of right knee pain.  Right hip x-ray does show degenerative changes.  Right knee x-ray is normal.  Explained could be due to abnormal gait associated with right hip pain.  Patella somewhat hypermobile compared to the left.  Patient would like to be checked at [facility] orthopedics possible steroid injections.  Referral placed and patient informed he will have to call and make this on appointment.    In the meantime, Tylenol 500 mg 3-4 times daily, continue to be active as tolerated.     Supportive care discussed.  See discharge instructions below for specific recommendations given.    At the end of the encounter, I discussed results, diagnosis, medications with the patient and/or caregivers. Discussed red flags for immediate return to clinic/ER, as well as indications for follow up if no improvement. Patient and/or caregiver understood and agreed to plan. Patient was stable for discharge.      SUBJECTIVE    HPI:  HPI  Jonathan Blackwell presents to the walk-in clinic with   Chief Complaint   Patient presents with   ? Pain     knee and hip pain (bilateral side), hip pain  3wks ago (getting worse), knee pain started 1.5wks ago, no known injury     Right hip pain going down gradually to right knee.  Pain worse with first getting up - pain 6/10. Taking Aleve which helps, but now knee is the worst.      Stopped playing golf in the last 2 years due to shoulder which required surgery as well as dizziness and balance problems.  Does not use a cane or walker.  No falls recently.    No injuries.  No fevers.  No known history of arthritis.  No previous steroid injections or similar for either knee and hip.    No increase in activity recently.     See ROS for additional symptoms and/or pertinent negatives.       History obtained from the patient.      Review of Systems   Constitutional: Negative for chills and fever.       OBJECTIVE    Vitals:    06/17/21 1532   BP: 126/79   Pulse: 72   Resp: 12   Temp: 98.1  F (36.7  C)   TempSrc: Oral   SpO2: 96%       Physical Exam  Constitutional:       General: He is not in acute distress.     Appearance: He is well-developed.   Eyes:      General:         Right eye: No discharge.         Left eye: No discharge.      Conjunctiva/sclera: Conjunctivae normal.   Cardiovascular:      Pulses: Normal pulses.   Pulmonary:      Effort: Pulmonary effort is normal.   Musculoskeletal: Normal range of motion.      Right hip: He exhibits normal range of motion (Internal rotation of hip triggers right groin symptoms.  Normal external rotation.), no swelling and no deformity.      Right knee: He exhibits abnormal patellar mobility (Right kneecap mobile over approximately 50% of width.  Left knee Minimally mobile in comparison.). He exhibits normal range of motion and no effusion. No tenderness found.   Skin:     General: Skin is warm and dry.      Capillary Refill: Capillary refill takes less than 2 seconds.   Neurological:      Mental Status: He is alert and oriented to person, place, and time.   Psychiatric:         Mood and Affect: Mood normal.         Behavior:  Behavior normal.         Thought Content: Thought content normal.         Judgment: Judgment normal.         Labs/EKG:  Results for orders placed or performed in visit on 05/19/21   Comprehensive Metabolic Panel   Result Value Ref Range    Sodium 135 (L) 136 - 145 mmol/L    Potassium 5.0 3.5 - 5.0 mmol/L    Chloride 99 98 - 107 mmol/L    CO2 25 22 - 31 mmol/L    Anion Gap, Calculation 11 5 - 18 mmol/L    Glucose 103 70 - 125 mg/dL    BUN 20 8 - 28 mg/dL    Creatinine 0.94 0.70 - 1.30 mg/dL    GFR MDRD Af Amer >60 >60 mL/min/1.73m2    GFR MDRD Non Af Amer >60 >60 mL/min/1.73m2    Bilirubin, Total 0.5 0.0 - 1.0 mg/dL    Calcium 8.9 8.5 - 10.5 mg/dL    Protein, Total 6.7 6.0 - 8.0 g/dL    Albumin 3.5 3.5 - 5.0 g/dL    Alkaline Phosphatase 46 45 - 120 U/L    AST 24 0 - 40 U/L    ALT 18 0 - 45 U/L   Lipid Cascade RANDOM   Result Value Ref Range    Cholesterol 135 <=199 mg/dL    Triglycerides 103 <=149 mg/dL    HDL Cholesterol 46 >=40 mg/dL    LDL Calculated 68 <=129 mg/dL    Patient Fasting > 8hrs? No    HM1 (CBC with Diff)   Result Value Ref Range    WBC 6.6 4.0 - 11.0 thou/uL    RBC 4.30 (L) 4.40 - 6.20 mill/uL    Hemoglobin 13.4 (L) 14.0 - 18.0 g/dL    Hematocrit 40.5 40.0 - 54.0 %    MCV 94 80 - 100 fL    MCH 31.2 27.0 - 34.0 pg    MCHC 33.1 32.0 - 36.0 g/dL    RDW 13.0 11.0 - 14.5 %    Platelets 298 140 - 440 thou/uL    MPV 9.6 7.0 - 10.0 fL    Neutrophils % 53 50 - 70 %    Lymphocytes % 31 20 - 40 %    Monocytes % 12 (H) 2 - 10 %    Eosinophils % 3 0 - 6 %    Basophils % 1 0 - 2 %    Immature Granulocyte % 0 <=0 %    Neutrophils Absolute 3.5 2.0 - 7.7 thou/uL    Lymphocytes Absolute 2.0 0.8 - 4.4 thou/uL    Monocytes Absolute 0.8 0.0 - 0.9 thou/uL    Eosinophils Absolute 0.2 0.0 - 0.4 thou/uL    Basophils Absolute 0.1 0.0 - 0.2 thou/uL    Immature Granulocyte Absolute 0.0 <=0.0 thou/uL           Radiology:    Xr Hip Right 2 Or More Vws    Result Date: 6/17/2021  EXAM DATE:         06/17/2021 EXAM: X-RAY HIP, RIGHT,  MINIMUM 2 VIEWS LOCATION: [facility] DATE/TIME: 6/17/2021 4:30 PM INDICATION: Hip pain for 3 weeks. COMPARISON: None. IMPRESSION: Degenerative change both hip joints. No evidence for fracture or dislocation. Pelvis negative for fracture. Degenerative change at the SI joints.     Xr Knee Right Plus Sunrise Vw    Result Date: 6/17/2021  EXAM DATE:         06/17/2021 EXAM: X-RAY KNEE, RIGHT, 3 VIEWS LOCATION: [facility] DATE/TIME: 6/17/2021 4:45 PM INDICATION: Pain COMPARISON: None. IMPRESSION: The right knee is negative for fracture. Minimal lateral compartment narrowing. No effusion.       Radiology result(s) from this visit was independently reviewed by Bailey Ceja CNP       PATIENT INSTRUCTIONS:   Patient Instructions   Recommend calling orthopedics for appointment related to your hip.  You do have degenerative changes/arthritis in both hips.  You may call [place] or [facility] orthopedics depending on your preference and insurance coverage.    Your knee x-ray was actually normal without significant arthritis    Recommend 500 mg Tylenol 3-4 times daily as needed.  Consider scheduling 3 times daily.    Continue to stay active unless pain is very severe.    Can try icing your hip or knee after activity if needed.  See handout for more details.          Patient Education     Osteoarthritis  Osteoarthritis (also called degenerative joint disease) happens when the cartilage in a joint becomes damaged and worn. This may be due to age, wear and tear, overuse of the joint, or other problems. Osteoarthritis can affect any joint. But it is most common in hands, knees, spine, hips, and feet. Symptoms include joint stiffness, pain, and swelling.  Home care    When a joint is more sore than usual, rest it for a day or two.    Heat can help relieve stiffness. Take a hot bath or apply a heating pad for up to 30 minutes at a time. If symptoms are worse in the  morning, using heat just after awakening can help relax the muscle and soothe the joints.     Ice helps relieve pain and swelling. It is often used after activity. Use a cold pack wrapped in a thin cloth on the joint for 10 to 15 minutes at a time.     Alternating hot and cold can also help relieve pain. Try this for 20 minutes at a time, several times per day.    Exercise helps prevent the muscles and ligaments around the joint from becoming weak. It also helps maintain function in the joint.  Be as active as you can. Talk to your healthcare provider about what activity program is best for you.    Excess weight puts a lot of extra strain on weight-bearing joints of the lower back, hips, knees, feet and ankles. If you are overweight, talk to your healthcare provider about a safe and effective weight loss program.    Use anti-inflammatory medicines as prescribed for pain. This includes acetaminophen or NSAIDs such as ibuprofen or naproxen. If needed, topical or injected medicines may be recommended. Talk to your healthcare provider if these options are not enough to manage your pain.    Talk with your healthcare provider about devices that might help improve your function and reduce pain.  Follow-up care  Follow up with your healthcare provider as advised by our staff.  When to seek medical advice  Call your healthcare provider right away if any of these occur:    Redness or swelling of a painful joint    Discharge or pus from a painful joint    Fever of 100.4 F (38 C) or higher, or as directed by your healthcare provider    Worsening joint pain    Decreased ability to move the joint or bear weight on the joint  Date Last Reviewed: 3/1/2017    7784-9011 [employer]. [address] [place] [address], [place] [address]. All rights reserved. This information is not intended as a substitute for professional medical care. Always follow your healthcare professional's instructions.

## 2021-07-04 NOTE — PATIENT INSTRUCTIONS - HE
Patient Instructions by Bailey Ceja CNP at 6/17/2021  3:30 PM     Author: Bailey Ceja CNP Service: -- Author Type: Nurse Practitioner    Filed: 6/17/2021  5:35 PM Encounter Date: 6/17/2021 Status: Addendum    : Bailey Ceja CNP (Nurse Practitioner)    Related Notes: Original Note by Bailey Ceja CNP (Nurse Practitioner) filed at 6/17/2021  5:34 PM       Recommend calling orthopedics for appointment related to your hip.  You do have degenerative changes/arthritis in both hips.  You may call [place] or [facility] orthopedics depending on your preference and insurance coverage.    Your knee x-ray was actually normal without significant arthritis    Recommend 500 mg Tylenol 3-4 times daily as needed.  Consider scheduling 3 times daily.    Continue to stay active unless pain is very severe.    Can try icing your hip or knee after activity if needed.  See handout for more details.          Patient Education     Osteoarthritis  Osteoarthritis (also called degenerative joint disease) happens when the cartilage in a joint becomes damaged and worn. This may be due to age, wear and tear, overuse of the joint, or other problems. Osteoarthritis can affect any joint. But it is most common in hands, knees, spine, hips, and feet. Symptoms include joint stiffness, pain, and swelling.  Home care    When a joint is more sore than usual, rest it for a day or two.    Heat can help relieve stiffness. Take a hot bath or apply a heating pad for up to 30 minutes at a time. If symptoms are worse in the morning, using heat just after awakening can help relax the muscle and soothe the joints.     Ice helps relieve pain and swelling. It is often used after activity. Use a cold pack wrapped in a thin cloth on the joint for 10 to 15 minutes at a time.     Alternating hot and cold can also help relieve pain. Try this for 20 minutes at a time, several times per day.    Exercise helps prevent the muscles and ligaments  around the joint from becoming weak. It also helps maintain function in the joint.  Be as active as you can. Talk to your healthcare provider about what activity program is best for you.    Excess weight puts a lot of extra strain on weight-bearing joints of the lower back, hips, knees, feet and ankles. If you are overweight, talk to your healthcare provider about a safe and effective weight loss program.    Use anti-inflammatory medicines as prescribed for pain. This includes acetaminophen or NSAIDs such as ibuprofen or naproxen. If needed, topical or injected medicines may be recommended. Talk to your healthcare provider if these options are not enough to manage your pain.    Talk with your healthcare provider about devices that might help improve your function and reduce pain.  Follow-up care  Follow up with your healthcare provider as advised by our staff.  When to seek medical advice  Call your healthcare provider right away if any of these occur:    Redness or swelling of a painful joint    Discharge or pus from a painful joint    Fever of 100.4 F (38 C) or higher, or as directed by your healthcare provider    Worsening joint pain    Decreased ability to move the joint or bear weight on the joint  Date Last Reviewed: 3/1/2017    5703-2468 [employer]. [address] [place] [address], [place] [address]. All rights reserved. This information is not intended as a substitute for professional medical care. Always follow your healthcare professional's instructions.

## 2021-07-04 NOTE — ADDENDUM NOTE
Addendum Note by Dae Teague CMA at 5/11/2021 11:14 AM     Author: Dae Teague CMA Service: -- Author Type: Certified Medical Assistant    Filed: 5/11/2021 11:14 AM Encounter Date: 5/10/2021 Status: Signed    : Dae Teague CMA (Certified Medical Assistant)    Addended by: DAE TEAGUE on: 5/11/2021 11:14 AM        Modules accepted: Orders

## 2021-07-06 VITALS
RESPIRATION RATE: 12 BRPM | TEMPERATURE: 98.1 F | SYSTOLIC BLOOD PRESSURE: 126 MMHG | HEART RATE: 72 BPM | DIASTOLIC BLOOD PRESSURE: 79 MMHG | OXYGEN SATURATION: 96 %

## 2021-07-15 ENCOUNTER — TRANSFERRED RECORDS (OUTPATIENT)
Dept: HEALTH INFORMATION MANAGEMENT | Facility: CLINIC | Age: 81
End: 2021-07-15

## 2021-10-21 DIAGNOSIS — E78.5 HYPERLIPIDEMIA: ICD-10-CM

## 2021-10-26 RX ORDER — LOVASTATIN 40 MG
TABLET ORAL
Qty: 45 TABLET | Refills: 1 | Status: SHIPPED | OUTPATIENT
Start: 2021-10-26

## 2021-10-27 NOTE — TELEPHONE ENCOUNTER
"Last Written Prescription Date:  6/10/2021  Last Fill Quantity: 45,  # refills: 0   Last office visit provider:  6/17/2021 with RADHA Ceja CNP @ [facility]       Requested Prescriptions   Pending Prescriptions Disp Refills     lovastatin (MEVACOR) 40 MG tablet 45 tablet 0     Sig: TAKE HALF TABLET BY MOUTH AT BEDTIME       Statins Protocol Passed - 10/25/2021 12:19 PM        Passed - LDL on file in past 12 months     Recent Labs   Lab Test 05/19/21  1300   LDL 68             Passed - No abnormal creatine kinase in past 12 months     No lab results found.             Passed - Recent (12 mo) or future (30 days) visit within the authorizing provider's specialty     Patient has had an office visit with the authorizing provider or a provider within the authorizing providers department within the previous 12 mos or has a future within next 30 days. See \"Patient Info\" tab in inbasket, or \"Choose Columns\" in Meds & Orders section of the refill encounter.              Passed - Medication is active on med list        Passed - Patient is age 18 or older             Rosey Betancourt RN 10/26/21 9:48 PM  "

## 2021-12-19 DIAGNOSIS — E78.5 HYPERLIPIDEMIA: ICD-10-CM

## 2021-12-19 DIAGNOSIS — I10 ESSENTIAL HYPERTENSION: ICD-10-CM

## 2021-12-21 NOTE — TELEPHONE ENCOUNTER
"Routing refill request to provider for review/approval because:  Labs out of range:  NA    Last Written Prescription Date:  3/2/2021  Last Fill Quantity: 90,  # refills: 2   Last office visit provider:  6/17/2021     Requested Prescriptions   Pending Prescriptions Disp Refills     losartan-hydrochlorothiazide (HYZAAR) 50-12.5 MG tablet 90 tablet 2     Sig: [LOSARTAN-HYDROCHLOROTHIAZIDE (HYZAAR) 50-12.5 MG PER TABLET] TAKE ONE TABLET BY MOUTH ONE TIME DAILY       Diuretics (Including Combos) Protocol Failed - 12/19/2021  3:08 PM        Failed - Normal serum sodium on file in past 12 months     Recent Labs   Lab Test 05/19/21  1300   *              Passed - Blood pressure under 140/90 in past 12 months     BP Readings from Last 3 Encounters:   06/17/21 126/79   05/19/21 128/80   03/05/20 120/76                 Passed - Recent (12 mo) or future (30 days) visit within the authorizing provider's specialty     Patient has had an office visit with the authorizing provider or a provider within the authorizing providers department within the previous 12 mos or has a future within next 30 days. See \"Patient Info\" tab in inbasket, or \"Choose Columns\" in Meds & Orders section of the refill encounter.              Passed - Medication is active on med list        Passed - Patient is age 18 or older        Passed - Normal serum creatinine on file in past 12 months     Recent Labs   Lab Test 05/19/21  1300   CR 0.94              Passed - Normal serum potassium on file in past 12 months     Recent Labs   Lab Test 05/19/21  1300   POTASSIUM 5.0                   Angiotensin-II Receptors Passed - 12/19/2021  3:08 PM        Passed - Last blood pressure under 140/90 in past 12 months     BP Readings from Last 3 Encounters:   06/17/21 126/79   05/19/21 128/80   03/05/20 120/76                 Passed - Recent (12 mo) or future (30 days) visit within the authorizing provider's specialty     Patient has had an office visit with the " "authorizing provider or a provider within the authorizing providers department within the previous 12 mos or has a future within next 30 days. See \"Patient Info\" tab in inbasket, or \"Choose Columns\" in Meds & Orders section of the refill encounter.              Passed - Medication is active on med list        Passed - Patient is age 18 or older        Passed - Normal serum creatinine on file in past 12 months     Recent Labs   Lab Test 05/19/21  1300   CR 0.94       Ok to refill medication if creatinine is low          Passed - Normal serum potassium on file in past 12 months     Recent Labs   Lab Test 05/19/21  1300   POTASSIUM 5.0                         Corie Varela RN 12/21/21 4:21 PM  "

## 2021-12-22 RX ORDER — LOSARTAN POTASSIUM AND HYDROCHLOROTHIAZIDE 12.5; 5 MG/1; MG/1
TABLET ORAL
Qty: 90 TABLET | Refills: 0 | Status: SHIPPED | OUTPATIENT
Start: 2021-12-22 | End: 2022-03-23

## 2021-12-22 NOTE — TELEPHONE ENCOUNTER
Last fill 03/02/21    Last seen 6/17/21    BP Readings from Last 3 Encounters:   06/17/21 126/79   05/19/21 128/80   03/05/20 120/76     No upcoming appointment

## 2022-02-25 ENCOUNTER — TRANSFERRED RECORDS (OUTPATIENT)
Dept: HEALTH INFORMATION MANAGEMENT | Facility: CLINIC | Age: 82
End: 2022-02-25
Payer: COMMERCIAL

## 2022-03-18 DIAGNOSIS — I10 ESSENTIAL HYPERTENSION: ICD-10-CM

## 2022-03-22 NOTE — TELEPHONE ENCOUNTER
"Routing refill request to provider for review/approval because:  Labs out of range:  Na    Last Written Prescription Date:  12/22/2021  Last Fill Quantity: 90,  # refills: 0   Last office visit provider:  6/17/2021     Requested Prescriptions   Pending Prescriptions Disp Refills     losartan-hydrochlorothiazide (HYZAAR) 50-12.5 MG tablet 90 tablet 0     Sig: [LOSARTAN-HYDROCHLOROTHIAZIDE (HYZAAR) 50-12.5 MG PER TABLET] TAKE ONE TABLET BY MOUTH ONE TIME DAILY       Diuretics (Including Combos) Protocol Failed - 3/21/2022 12:34 PM        Failed - Normal serum sodium on file in past 12 months     Recent Labs   Lab Test 05/19/21  1300   *              Passed - Blood pressure under 140/90 in past 12 months     BP Readings from Last 3 Encounters:   06/17/21 126/79   05/19/21 128/80   03/05/20 120/76                 Passed - Recent (12 mo) or future (30 days) visit within the authorizing provider's specialty     Patient has had an office visit with the authorizing provider or a provider within the authorizing providers department within the previous 12 mos or has a future within next 30 days. See \"Patient Info\" tab in inbasket, or \"Choose Columns\" in Meds & Orders section of the refill encounter.              Passed - Medication is active on med list        Passed - Patient is age 18 or older        Passed - Normal serum creatinine on file in past 12 months     Recent Labs   Lab Test 05/19/21  1300   CR 0.94              Passed - Normal serum potassium on file in past 12 months     Recent Labs   Lab Test 05/19/21  1300   POTASSIUM 5.0                   Angiotensin-II Receptors Passed - 3/21/2022 12:34 PM        Passed - Last blood pressure under 140/90 in past 12 months     BP Readings from Last 3 Encounters:   06/17/21 126/79   05/19/21 128/80   03/05/20 120/76                 Passed - Recent (12 mo) or future (30 days) visit within the authorizing provider's specialty     Patient has had an office visit with the " "authorizing provider or a provider within the authorizing providers department within the previous 12 mos or has a future within next 30 days. See \"Patient Info\" tab in inbasket, or \"Choose Columns\" in Meds & Orders section of the refill encounter.              Passed - Medication is active on med list        Passed - Patient is age 18 or older        Passed - Normal serum creatinine on file in past 12 months     Recent Labs   Lab Test 05/19/21  1300   CR 0.94       Ok to refill medication if creatinine is low          Passed - Normal serum potassium on file in past 12 months     Recent Labs   Lab Test 05/19/21  1300   POTASSIUM 5.0                         Junie James RN 03/22/22 1:07 PM  "

## 2022-03-23 RX ORDER — LOSARTAN POTASSIUM AND HYDROCHLOROTHIAZIDE 12.5; 5 MG/1; MG/1
TABLET ORAL
Qty: 90 TABLET | Refills: 0 | Status: SHIPPED | OUTPATIENT
Start: 2022-03-23 | End: 2022-06-24

## 2022-04-05 ENCOUNTER — LAB REQUISITION (OUTPATIENT)
Dept: LAB | Facility: CLINIC | Age: 82
End: 2022-04-05

## 2022-04-05 DIAGNOSIS — I10 ESSENTIAL (PRIMARY) HYPERTENSION: ICD-10-CM

## 2022-04-05 DIAGNOSIS — E78.2 MIXED HYPERLIPIDEMIA: ICD-10-CM

## 2022-04-05 LAB
ALBUMIN SERPL-MCNC: 3.4 G/DL (ref 3.5–5)
ALP SERPL-CCNC: 45 U/L (ref 45–120)
ALT SERPL W P-5'-P-CCNC: 17 U/L (ref 0–45)
ANION GAP SERPL CALCULATED.3IONS-SCNC: 13 MMOL/L (ref 5–18)
AST SERPL W P-5'-P-CCNC: ABNORMAL U/L
BILIRUB SERPL-MCNC: 0.6 MG/DL (ref 0–1)
BUN SERPL-MCNC: 16 MG/DL (ref 8–28)
CALCIUM SERPL-MCNC: 9 MG/DL (ref 8.5–10.5)
CHLORIDE BLD-SCNC: 104 MMOL/L (ref 98–107)
CHOLEST SERPL-MCNC: 139 MG/DL
CO2 SERPL-SCNC: 20 MMOL/L (ref 22–31)
CREAT SERPL-MCNC: 0.92 MG/DL (ref 0.7–1.3)
ERYTHROCYTE [DISTWIDTH] IN BLOOD BY AUTOMATED COUNT: 14.2 % (ref 10–15)
FASTING STATUS PATIENT QL REPORTED: NORMAL
GFR SERPL CREATININE-BSD FRML MDRD: 83 ML/MIN/1.73M2
GLUCOSE BLD-MCNC: 96 MG/DL (ref 70–125)
HCT VFR BLD AUTO: 42.6 % (ref 40–53)
HDLC SERPL-MCNC: 50 MG/DL
HGB BLD-MCNC: 14 G/DL (ref 13.3–17.7)
LDLC SERPL CALC-MCNC: 69 MG/DL
MCH RBC QN AUTO: 31.2 PG (ref 26.5–33)
MCHC RBC AUTO-ENTMCNC: 32.9 G/DL (ref 31.5–36.5)
MCV RBC AUTO: 95 FL (ref 78–100)
PLATELET # BLD AUTO: 122 10E3/UL (ref 150–450)
POTASSIUM BLD-SCNC: ABNORMAL MMOL/L
PROT SERPL-MCNC: 7.3 G/DL (ref 6–8)
RBC # BLD AUTO: 4.49 10E6/UL (ref 4.4–5.9)
SODIUM SERPL-SCNC: 137 MMOL/L (ref 136–145)
TRIGL SERPL-MCNC: 98 MG/DL
WBC # BLD AUTO: 5.3 10E3/UL (ref 4–11)

## 2022-04-05 PROCEDURE — 85027 COMPLETE CBC AUTOMATED: CPT | Performed by: FAMILY MEDICINE

## 2022-04-05 PROCEDURE — 80061 LIPID PANEL: CPT | Performed by: FAMILY MEDICINE

## 2022-04-05 PROCEDURE — 82310 ASSAY OF CALCIUM: CPT | Performed by: FAMILY MEDICINE

## 2022-04-05 PROCEDURE — 82947 ASSAY GLUCOSE BLOOD QUANT: CPT | Performed by: FAMILY MEDICINE

## 2022-06-24 DIAGNOSIS — R33.9 URINE RETENTION: ICD-10-CM

## 2022-06-24 DIAGNOSIS — I10 ESSENTIAL HYPERTENSION: ICD-10-CM

## 2022-06-24 RX ORDER — LOSARTAN POTASSIUM AND HYDROCHLOROTHIAZIDE 12.5; 5 MG/1; MG/1
TABLET ORAL
Qty: 90 TABLET | Refills: 0 | Status: SHIPPED | OUTPATIENT
Start: 2022-06-24 | End: 2024-06-12

## 2022-06-24 RX ORDER — TAMSULOSIN HYDROCHLORIDE 0.4 MG/1
CAPSULE ORAL
Qty: 90 CAPSULE | Refills: 0 | Status: SHIPPED | OUTPATIENT
Start: 2022-06-24

## 2022-06-24 NOTE — TELEPHONE ENCOUNTER
Received fax from pharmacy requesting refill for     losartan-hydrochlorothiazide (HYZAAR) 50-12.5 MG tablet  Last refill: 03/23/2022    tamsulosin (FLOMAX) 0.4 mg cap  Last refill: 06/09/2021    Patient last seen: 05/19/2021    Okay for refill?

## 2022-09-23 DIAGNOSIS — R33.9 URINE RETENTION: ICD-10-CM

## 2022-09-23 DIAGNOSIS — I10 ESSENTIAL HYPERTENSION: ICD-10-CM

## 2022-09-25 NOTE — TELEPHONE ENCOUNTER
"Routing refill request to provider for review/approval because:  Patient needs to be seen because it has been more than 1 year since last office visit.    Last Written Prescription Date:  6/24/22  Last Fill Quantity: 90,  # refills: 0   Last office visit provider:  5/19/21     Requested Prescriptions   Pending Prescriptions Disp Refills     tamsulosin (FLOMAX) 0.4 MG capsule 90 capsule 0     Sig: [TAMSULOSIN (FLOMAX) 0.4 MG CAP] TAKE ONE CAPSULE BY MOUTH AT BEDTIME       Alpha Blockers Failed - 9/23/2022  4:38 PM        Failed - Blood pressure under 140/90 in past 12 months     BP Readings from Last 3 Encounters:   06/17/21 126/79   05/19/21 128/80   03/05/20 120/76                 Failed - Recent (12 mo) or future (30 days) visit within the authorizing provider's specialty     Patient has had an office visit with the authorizing provider or a provider within the authorizing providers department within the previous 12 mos or has a future within next 30 days. See \"Patient Info\" tab in inbasket, or \"Choose Columns\" in Meds & Orders section of the refill encounter.              Passed - Patient does not have Tadalafil, Vardenafil, or Sildenafil on their medication list        Passed - Medication is active on med list        Passed - Patient is 18 years of age or older           losartan-hydrochlorothiazide (HYZAAR) 50-12.5 MG tablet 90 tablet 0     Sig: [LOSARTAN-HYDROCHLOROTHIAZIDE (HYZAAR) 50-12.5 MG PER TABLET] TAKE ONE TABLET BY MOUTH ONE TIME DAILY       Angiotensin-II Receptors Failed - 9/23/2022  4:38 PM        Failed - Last blood pressure under 140/90 in past 12 months     BP Readings from Last 3 Encounters:   06/17/21 126/79   05/19/21 128/80   03/05/20 120/76                 Failed - Recent (12 mo) or future (30 days) visit within the authorizing provider's specialty     Patient has had an office visit with the authorizing provider or a provider within the authorizing providers department within the previous " "12 mos or has a future within next 30 days. See \"Patient Info\" tab in inbasket, or \"Choose Columns\" in Meds & Orders section of the refill encounter.              Failed - Normal serum potassium on file in past 12 months     Recent Labs   Lab Test 05/19/21  1300   POTASSIUM 5.0                    Passed - Medication is active on med list        Passed - Patient is age 18 or older        Passed - Normal serum creatinine on file in past 12 months     Recent Labs   Lab Test 04/05/22  0908   CR 0.92       Ok to refill medication if creatinine is low         Diuretics (Including Combos) Protocol Failed - 9/23/2022  4:38 PM        Failed - Blood pressure under 140/90 in past 12 months     BP Readings from Last 3 Encounters:   06/17/21 126/79   05/19/21 128/80   03/05/20 120/76                 Failed - Recent (12 mo) or future (30 days) visit within the authorizing provider's specialty     Patient has had an office visit with the authorizing provider or a provider within the authorizing providers department within the previous 12 mos or has a future within next 30 days. See \"Patient Info\" tab in inbasket, or \"Choose Columns\" in Meds & Orders section of the refill encounter.              Failed - Normal serum potassium on file in past 12 months     Recent Labs   Lab Test 05/19/21  1300   POTASSIUM 5.0                    Passed - Medication is active on med list        Passed - Patient is age 18 or older        Passed - Normal serum creatinine on file in past 12 months     Recent Labs   Lab Test 04/05/22  0908   CR 0.92              Passed - Normal serum sodium on file in past 12 months     Recent Labs   Lab Test 04/05/22  0908                      Sophia Beckford, RN 09/24/22 7:19 PM  "

## 2022-09-26 RX ORDER — TAMSULOSIN HYDROCHLORIDE 0.4 MG/1
CAPSULE ORAL
Qty: 90 CAPSULE | Refills: 0 | OUTPATIENT
Start: 2022-09-26

## 2022-09-26 RX ORDER — LOSARTAN POTASSIUM AND HYDROCHLOROTHIAZIDE 12.5; 5 MG/1; MG/1
TABLET ORAL
Qty: 90 TABLET | Refills: 0 | OUTPATIENT
Start: 2022-09-26

## 2022-10-04 ENCOUNTER — APPOINTMENT (OUTPATIENT)
Dept: RADIOLOGY | Facility: HOSPITAL | Age: 82
End: 2022-10-04
Attending: EMERGENCY MEDICINE
Payer: COMMERCIAL

## 2022-10-04 ENCOUNTER — HOSPITAL ENCOUNTER (EMERGENCY)
Facility: HOSPITAL | Age: 82
Discharge: HOME | End: 2022-10-04
Attending: EMERGENCY MEDICINE | Admitting: EMERGENCY MEDICINE
Payer: COMMERCIAL

## 2022-10-04 VITALS
TEMPERATURE: 98 F | RESPIRATION RATE: 18 BRPM | BODY MASS INDEX: 32.78 KG/M2 | HEART RATE: 108 BPM | HEIGHT: 63 IN | OXYGEN SATURATION: 97 % | DIASTOLIC BLOOD PRESSURE: 87 MMHG | WEIGHT: 185 LBS | SYSTOLIC BLOOD PRESSURE: 138 MMHG
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
## 2022-10-04 DIAGNOSIS — S40.812A ABRASION OF LEFT UPPER EXTREMITY, INITIAL ENCOUNTER: ICD-10-CM

## 2022-10-04 PROBLEM — E78.00 HYPERCHOLESTEREMIA: Status: ACTIVE | Noted: 2022-10-04

## 2022-10-04 PROBLEM — D12.6 BENIGN NEOPLASM OF COLON: Status: ACTIVE | Noted: 2022-10-04

## 2022-10-04 PROBLEM — N13.8 BENIGN PROSTATIC HYPERPLASIA WITH URINARY OBSTRUCTION: Status: ACTIVE | Noted: 2022-10-04

## 2022-10-04 PROBLEM — E55.9 VITAMIN D DEFICIENCY: Status: ACTIVE | Noted: 2022-10-04

## 2022-10-04 PROBLEM — I10 PRIMARY HYPERTENSION: Status: ACTIVE | Noted: 2022-10-04

## 2022-10-04 PROBLEM — N40.1 BENIGN PROSTATIC HYPERPLASIA WITH URINARY OBSTRUCTION: Status: ACTIVE | Noted: 2022-10-04

## 2022-10-04 PROCEDURE — 250N000011 HC RX IP 250 OP 636: Performed by: EMERGENCY MEDICINE

## 2022-10-04 PROCEDURE — 250N000009 HC RX 250: Performed by: EMERGENCY MEDICINE

## 2022-10-04 PROCEDURE — 73130 X-RAY EXAM OF HAND: CPT | Mod: LT

## 2022-10-04 PROCEDURE — 99283 EMERGENCY DEPT VISIT LOW MDM: CPT | Mod: 25

## 2022-10-04 PROCEDURE — 90471 IMMUNIZATION ADMIN: CPT | Performed by: EMERGENCY MEDICINE

## 2022-10-04 PROCEDURE — 90715 TDAP VACCINE 7 YRS/> IM: CPT | Performed by: EMERGENCY MEDICINE

## 2022-10-04 RX ORDER — GINSENG 100 MG
CAPSULE ORAL ONCE
Status: COMPLETED | OUTPATIENT
Start: 2022-10-04 | End: 2022-10-04

## 2022-10-04 RX ADMIN — CLOSTRIDIUM TETANI TOXOID ANTIGEN (FORMALDEHYDE INACTIVATED), CORYNEBACTERIUM DIPHTHERIAE TOXOID ANTIGEN (FORMALDEHYDE INACTIVATED), BORDETELLA PERTUSSIS TOXOID ANTIGEN (GLUTARALDEHYDE INACTIVATED), BORDETELLA PERTUSSIS FILAMENTOUS HEMAGGLUTININ ANTIGEN (FORMALDEHYDE INACTIVATED), BORDETELLA PERTUSSIS PERTACTIN ANTIGEN, AND BORDETELLA PERTUSSIS FIMBRIAE 2/3 ANTIGEN 0.5 ML: 5; 2; 2.5; 5; 3; 5 INJECTION, SUSPENSION INTRAMUSCULAR at 20:09

## 2022-10-04 RX ADMIN — BACITRACIN: 500 OINTMENT TOPICAL at 20:04

## 2022-10-04 NOTE — ED NOTES
ED Provider In Triage Note  [facility]  Encounter Date: Oct 4, 2022    Chief Complaint   Patient presents with     Fall       Brief HPI:   Jonathan Blackwell is a 82 year old male presenting to the Emergency Department with a chief complaint of fall.  Patient fell while coming out of [place] this morning.  He has multiple abrasions to his left knee, left arm.  He is most concerned about some swelling on the dorsum of his left hand.  Did not hit his head or lose consciousness.  Denies any neck pain chest pain or back pain.    Brief Physical Exam:  There were no vitals taken for this visit.  General: Non-toxic appearing  HEENT: Atraumatic  Resp: No respiratory distress  Abdomen: Non-peritoneal  Neuro: Alert, oriented, answers questions appropriately  Psych: Behavior appropriate  MSK: Scattered abrasions to the bilateral upper extremities, no deep lacerations visible.  There is swelling and hematoma to the dorsum of the left hand.    Plan Initiated in Triage:  Orders Placed This Encounter     XR Hand Left G/E 3 Views       PIT Dispo:   Return to lobby while awaiting workup and ED bed availability    Patient concerned about left hand swelling after a fall, hand x-ray ordered.  Tdap up-to-date.    Bong Cooley MD on 10/4/2022 at 1:08 PM    Patient was evaluated by the Physician in Triage due to a limitation of available rooms in the Emergency Department. A plan of care was discussed based on the information obtained on the initial evaluation and patient was consuled to return back to the Emergency Department lobby after this initial evalutaiton until results were obtained or a room became available in the Emergency Department. Patient was counseled not to leave prior to receiving the results of their workup.     Bong Cooley MD  [facility] EMERGENCY DEPARTMENT  [address] [place] [id number]  [phone]     Bong Cooley MD  10/04/22 9502

## 2022-10-04 NOTE — ED TRIAGE NOTES
Patient tripped and fell ; did not hit the head; not on any blood thinner.  Patient has abrasions on the left hand , left knee. Denied any pain.      Triage Assessment     Row Name 10/04/22 1141       Triage Assessment (Adult)    Airway WDL WDL       Respiratory WDL    Respiratory WDL WDL       Skin Circulation/Temperature WDL    Skin Circulation/Temperature WDL WDL       Cardiac WDL    Cardiac WDL WDL       Peripheral/Neurovascular WDL    Peripheral Neurovascular WDL WDL       Cognitive/Neuro/Behavioral WDL    Cognitive/Neuro/Behavioral WDL WDL

## 2022-10-04 NOTE — ED PROVIDER NOTES
"EMERGENCY DEPARTMENT NOTE     Name: Jonathan Blackwell    Age/Sex: 82 year old male   MRN: [id number]   Evaluation Date & Time:  No admission date for patient encounter.    PCP:    Corie Spears   ED Provider: Tyree Leal D.O.       CHIEF COMPLAINT    Fall       DIAGNOSIS & DISPOSITION     1. Fall, initial encounter    2. Abrasion of left upper extremity, initial encounter      DISPOSITION: Home    At the conclusion of the encounter I discussed the results of all of the tests and the disposition. The questions were answered. The patient or family acknowledged understanding and was agreeable with the care plan.    TOTAL CRITICAL CARE TIME (EXCLUDING PROCEDURES): Not applicable    PROCEDURES:   None    EMERGENCY DEPARTMENT COURSE/MEDICAL DECISION MAKING   6:59 PM I met with the patient to gather history and to perform my initial exam.  We discussed treatment options and the plan for care while in the Emergency Department.    Jonathan Blackwell is a 82 year old male with relevant past history of HTN who presents to the emergency department for evaluation of fall.    Triage note reviewed:  Patient tripped and fell ; did not hit the head; not on any blood thinner.  Patient has abrasions on the left hand , left knee. Denied any pain.      Triage Assessment     Row Name 10/04/22 1309       Triage Assessment (Adult)    Airway WDL WDL       Respiratory WDL    Respiratory WDL WDL       Skin Circulation/Temperature WDL    Skin Circulation/Temperature WDL WDL       Cardiac WDL    Cardiac WDL WDL       Peripheral/Neurovascular WDL    Peripheral Neurovascular WDL WDL       Cognitive/Neuro/Behavioral WDL    Cognitive/Neuro/Behavioral WDL WDL                Vital signs:/87   Pulse 108   Temp 98  F (36.7  C) (Oral)   Resp 18   Ht 1.6 m (5' 3\")   Wt 83.9 kg (185 lb)   SpO2 97%   BMI 32.77 kg/m    Pertinent physical exam findings:  Diagnostic studies:  Imaging:  XR Hand Left G/E 3 Views   Final Result   IMPRESSION: No " evidence for displaced fracture. There is some degenerative change at the wrist joint. There is slight sclerosis within the distal ulna, which could represent a site of nondisplaced fracture, but this could be a chronic finding.    Degenerative change at the first CMC and MCP joints.         Lab:  Labs Ordered and Resulted from Time of ED Arrival to Time of ED Departure - No data to display   Interventions: Wound cleansing/care, Tdap  Medical decision making: No fractures identified on hand x-ray.  No tenderness of the distal radius or ulna noted to suggest wrist fracture.  Patient will be discharged to continue local wound care with daily cleansing of application of bacitracin.  Patient will return to the emergency department if any signs of infection including redness swelling or drainage.    ED INTERVENTIONS     Medications   Tdap (tetanus-diphtheria-acell pertussis) (ADACEL) injection 0.5 mL (0.5 mLs Intramuscular Given 10/4/22 2009)   bacitracin ointment ( Topical Given 10/4/22 2004)       DISCHARGE MEDICATIONS        Review of your medicines      UNREVIEWED medicines. Ask your doctor about these medicines      Dose / Directions   amoxicillin 500 MG tablet  Commonly known as: AMOXIL      [AMOXICILLIN (AMOXIL) 500 MG TABLET] TAKE 4 TABLETS ONE HOUR BEFORE DENTAL APPOINTMENT  Quantity: 12 tablet  Refills: 0     aspirin 81 MG EC tablet  Commonly known as: ASA      Dose: 81 mg  [ASPIRIN 81 MG EC TABLET] Take 81 mg by mouth daily.  Refills: 0     cholecalciferol 25 MCG (1000 UT) Tabs      Dose: 2,000 Units  [CHOLECALCIFEROL, VITAMIN D3, 1,000 UNIT TABLET] Take 2,000 Units by mouth daily.  Refills: 0     losartan-hydrochlorothiazide 50-12.5 MG tablet  Commonly known as: HYZAAR  Used for: Essential hypertension      [LOSARTAN-HYDROCHLOROTHIAZIDE (HYZAAR) 50-12.5 MG PER TABLET] TAKE ONE TABLET BY MOUTH ONE TIME DAILY  Quantity: 90 tablet  Refills: 0     lovastatin 40 MG tablet  Commonly known as: MEVACOR  Used for:  Hyperlipidemia      TAKE HALF TABLET BY MOUTH AT BEDTIME  Quantity: 45 tablet  Refills: 1     meloxicam 7.5 MG tablet  Commonly known as: MOBIC      [MELOXICAM (MOBIC) 7.5 MG TABLET]  Refills: 0     MULTIVITAMIN PO      Dose: 1 tablet  [MULTIVITAMIN ORAL] Take 1 tablet by mouth 3 (three) times a week.  Refills: 0     PRESERVISION/LUTEIN PO      [VIT C/VIT E AC/LUT/COPPER/ZINC (PRESERVISION LUTEIN ORAL)] Take by mouth.  Refills: 0     tamsulosin 0.4 MG capsule  Commonly known as: FLOMAX  Used for: Urine retention      [TAMSULOSIN (FLOMAX) 0.4 MG CAP] TAKE ONE CAPSULE BY MOUTH AT BEDTIME  Quantity: 90 capsule  Refills: 0              INFORMATION SOURCE AND LIMITATIONS    History/Exam limitations: None  Patient information was obtained from: Patient  Use of : N/A    HISTORY OF PRESENT ILLNESS   Jonathan Blackwell is a 82 year old male with relevant past history of HTN who presents to the emergency department for evaluation of fall.    The patient reports he was walking out of [place] this morning when he tripped and fell. He denies hitting his head or losing consciousness, denies any head or neck pain. He did sustain abrasions to his left wrist, left 1st finger, left knee, and right thumb. He denies cleaning these abrasions out. He endorses pain and swelling in his left hand. He denies any recent illness. Denies any cough, core throat, nausea, vomiting, diarrhea, or any other complaints at this time.     Per chart review, patient's last tdap vaccine was 11/13/2014 and is therefore UTD.      REVIEW OF SYSTEMS:   Constitutional: Negative for  fever.   HENT: Negative for URI symptoms or sore throat.    Cardiac: Negative for  chest pain,palpitations, near syncope or syncope  Respiratory: Negative for cough and shortness of breath.    Gastrointestinal: Negative for abdominal pain, nausea, vomiting, constipation, diarrhea, rectal bleeding or melena.  Genitourinary: Negative for dysuria, flank pain and hematuria.    Musculoskeletal: Negative for back pain. Positive for left hand pain and swelling.  Skin: Negative for  Rash. Positive for abrasions to left wrist, left 1st finger, left knee, and right thumb.  Neurological: Negative for dizziness, headache, syncope, speech difficulty, unilateral weakness or imbalance with walking.   Hematological: Negative for adenopathy. Does not bruise/bleed easily.   Psychiatric/Behavioral: Negative for confusion.       PATIENT HISTORY     Past Medical History:   Diagnosis Date     Anemia 10/9/2014     BPH (benign prostatic hyperplasia)      Hyperlipidemia      Hypertension      Patient Active Problem List   Diagnosis     Benign neoplasm of colon     Benign prostatic hyperplasia with urinary obstruction     Primary hypertension     Hypercholesteremia     Edema     S/P shoulder replacement     Urine retention     Vitamin D deficiency     Past Surgical History:   Procedure Laterality Date     [facility] APPENDECTOMY      Description: Appendectomy;  Recorded: 11/13/2014;     [facility] LAP,CHOLECYSTECTOMY/EXPLORE      Description: Cholecystectomy Laparoscopic;  Recorded: 11/13/2014;     [facility] RECONSTR TOTAL SHOULDER IMPLANT Left 10/9/2014    Procedure: SHOULDER TOTAL REPLACEMENT ;  Surgeon: Binu Rodriguez MD;  Location: [facility];  Service: Orthopedics     [facility] RECONSTR TOTAL SHOULDER IMPLANT      Description: Shoulder Arthroplasty Total Shoulder Replacement;  Recorded: 11/13/2014;  Comments: LEFT:   9 OCT 2014     Social Histrory  Smoking:None  Alcohol Use:None  Allergies   Allergen Reactions     Lisinopril Other (See Comments)     Swollen lips, Other reaction(s): HIVES, LIP SWELLING, HIVES, LIP SWELLING, Swollen lips, Other reaction(s): HIVES, LIP SWELLING, HIVES, LIP SWELLING         OUTPATIENT MEDICATIONS     Discharge Medication List as of 10/4/2022  7:57 PM         Vitals:    10/04/22 1307   BP: 138/87   Pulse: 108   Resp: 18   Temp: 98  F (36.7  C)   TempSrc: Oral   SpO2: 97%   Weight: 83.9 kg (185  "lb)   Height: 1.6 m (5' 3\")       Physical Exam   Constitutional: Oriented to person, place, and time. Appears well-developed and well-nourished.   HEENT:    Head: Atraumatic.   Neck: Normal range of motion. Neck supple.   Cardiovascular: Normal rate, regular rhythm and normal heart sounds.    Pulmonary/Chest: Normal effort  and breath sounds normal.   Abdominal: Soft. Bowel sounds are normal.   Musculoskeletal: Normal range of motion.   Neurological: Alert and oriented to person, place, and time. Normal strength.No sensory deficit. No cranial nerve deficit .   Skin: Skin is warm and dry. Abrasions to left lateral forearm, left knee, overlying MP joint of 1st finger on left hand, and on right thumb over distal phalanx.   Psychiatric: Normal mood and affect. Behavior is normal. Thought content normal.       DIAGNOSTICS    LABORATORY FINDINGS (REVIEWED AND INTERPRETED):  Labs Ordered and Resulted from Time of ED Arrival to Time of ED Departure - No data to display      IMAGING (REVIEWED AND INTERPRETED):  XR Hand Left G/E 3 Views   Final Result   IMPRESSION: No evidence for displaced fracture. There is some degenerative change at the wrist joint. There is slight sclerosis within the distal ulna, which could represent a site of nondisplaced fracture, but this could be a chronic finding.    Degenerative change at the first CMC and MCP joints.                I, Portia Lewis, am serving as a scribe to document services personally performed by Tyree Leal D.O., based on my observation and the provider s statements to me.    I, Tyree Leal D.O., attest that Portia Lewis is acting in a scribe capacity, has observed my performance of the services and has documented them in accordance with my direction.    Tyree Leal D.O.  EMERGENCY MEDICINE   10/04/22  [facility] EMERGENCY DEPARTMENT  [facility] [facility] [address] [phone]  Dept: [phone]     Tyree Leal, " DO  10/05/22 0145

## 2022-10-05 NOTE — DISCHARGE INSTRUCTIONS
Cleanse the abrasions daily with warm soapy water and apply bacitracin.  If signs of infection redness swelling or drainage return to the emergency department.

## 2023-04-07 ENCOUNTER — LAB REQUISITION (OUTPATIENT)
Dept: LAB | Facility: CLINIC | Age: 83
End: 2023-04-07

## 2023-04-07 DIAGNOSIS — I10 ESSENTIAL (PRIMARY) HYPERTENSION: ICD-10-CM

## 2023-04-07 DIAGNOSIS — E78.2 MIXED HYPERLIPIDEMIA: ICD-10-CM

## 2023-04-07 LAB
ALBUMIN SERPL BCG-MCNC: 3.9 G/DL (ref 3.5–5.2)
ALP SERPL-CCNC: 69 U/L (ref 40–129)
ALT SERPL W P-5'-P-CCNC: 22 U/L (ref 10–50)
ANION GAP SERPL CALCULATED.3IONS-SCNC: 13 MMOL/L (ref 7–15)
AST SERPL W P-5'-P-CCNC: 29 U/L (ref 10–50)
BILIRUB SERPL-MCNC: 0.3 MG/DL
BUN SERPL-MCNC: 14.4 MG/DL (ref 8–23)
CALCIUM SERPL-MCNC: 9.4 MG/DL (ref 8.8–10.2)
CHLORIDE SERPL-SCNC: 99 MMOL/L (ref 98–107)
CHOLEST SERPL-MCNC: 126 MG/DL
CREAT SERPL-MCNC: 1.04 MG/DL (ref 0.67–1.17)
DEPRECATED HCO3 PLAS-SCNC: 24 MMOL/L (ref 22–29)
ERYTHROCYTE [DISTWIDTH] IN BLOOD BY AUTOMATED COUNT: 14 % (ref 10–15)
GFR SERPL CREATININE-BSD FRML MDRD: 71 ML/MIN/1.73M2
GLUCOSE SERPL-MCNC: 97 MG/DL (ref 70–99)
HCT VFR BLD AUTO: 41.7 % (ref 40–53)
HDLC SERPL-MCNC: 51 MG/DL
HGB BLD-MCNC: 13.3 G/DL (ref 13.3–17.7)
LDLC SERPL CALC-MCNC: 52 MG/DL
MCH RBC QN AUTO: 30.8 PG (ref 26.5–33)
MCHC RBC AUTO-ENTMCNC: 31.9 G/DL (ref 31.5–36.5)
MCV RBC AUTO: 97 FL (ref 78–100)
NONHDLC SERPL-MCNC: 75 MG/DL
PLATELET # BLD AUTO: 328 10E3/UL (ref 150–450)
POTASSIUM SERPL-SCNC: 4.7 MMOL/L (ref 3.4–5.3)
PROT SERPL-MCNC: 6.7 G/DL (ref 6.4–8.3)
RBC # BLD AUTO: 4.32 10E6/UL (ref 4.4–5.9)
SODIUM SERPL-SCNC: 136 MMOL/L (ref 136–145)
TRIGL SERPL-MCNC: 115 MG/DL
WBC # BLD AUTO: 6.5 10E3/UL (ref 4–11)

## 2023-04-07 PROCEDURE — 85027 COMPLETE CBC AUTOMATED: CPT | Performed by: FAMILY MEDICINE

## 2023-04-07 PROCEDURE — 80053 COMPREHEN METABOLIC PANEL: CPT | Performed by: FAMILY MEDICINE

## 2023-04-07 PROCEDURE — 80061 LIPID PANEL: CPT | Performed by: FAMILY MEDICINE

## 2023-09-07 ENCOUNTER — PATIENT OUTREACH (OUTPATIENT)
Dept: CARE COORDINATION | Facility: CLINIC | Age: 83
End: 2023-09-07
Payer: COMMERCIAL

## 2023-09-21 ENCOUNTER — PATIENT OUTREACH (OUTPATIENT)
Dept: CARE COORDINATION | Facility: CLINIC | Age: 83
End: 2023-09-21
Payer: COMMERCIAL

## 2024-04-08 ENCOUNTER — LAB REQUISITION (OUTPATIENT)
Dept: LAB | Facility: CLINIC | Age: 84
End: 2024-04-08

## 2024-04-08 DIAGNOSIS — E78.2 MIXED HYPERLIPIDEMIA: ICD-10-CM

## 2024-04-08 DIAGNOSIS — I10 ESSENTIAL (PRIMARY) HYPERTENSION: ICD-10-CM

## 2024-04-08 LAB
ERYTHROCYTE [DISTWIDTH] IN BLOOD BY AUTOMATED COUNT: 14.7 % (ref 10–15)
HCT VFR BLD AUTO: 38.1 % (ref 40–53)
HGB BLD-MCNC: 12.4 G/DL (ref 13.3–17.7)
MCH RBC QN AUTO: 30.6 PG (ref 26.5–33)
MCHC RBC AUTO-ENTMCNC: 32.5 G/DL (ref 31.5–36.5)
MCV RBC AUTO: 94 FL (ref 78–100)
PLATELET # BLD AUTO: 286 10E3/UL (ref 150–450)
RBC # BLD AUTO: 4.05 10E6/UL (ref 4.4–5.9)
WBC # BLD AUTO: 6.3 10E3/UL (ref 4–11)

## 2024-04-08 PROCEDURE — 80061 LIPID PANEL: CPT | Performed by: FAMILY MEDICINE

## 2024-04-08 PROCEDURE — 85027 COMPLETE CBC AUTOMATED: CPT | Performed by: FAMILY MEDICINE

## 2024-04-08 PROCEDURE — 80053 COMPREHEN METABOLIC PANEL: CPT | Performed by: FAMILY MEDICINE

## 2024-04-09 LAB
ALBUMIN SERPL BCG-MCNC: 4 G/DL (ref 3.5–5.2)
ALP SERPL-CCNC: 50 U/L (ref 40–150)
ALT SERPL W P-5'-P-CCNC: 16 U/L (ref 0–70)
ANION GAP SERPL CALCULATED.3IONS-SCNC: 13 MMOL/L (ref 7–15)
AST SERPL W P-5'-P-CCNC: 21 U/L (ref 0–45)
BILIRUB SERPL-MCNC: 0.4 MG/DL
BUN SERPL-MCNC: 17.6 MG/DL (ref 8–23)
CALCIUM SERPL-MCNC: 8.9 MG/DL (ref 8.8–10.2)
CHLORIDE SERPL-SCNC: 99 MMOL/L (ref 98–107)
CHOLEST SERPL-MCNC: 137 MG/DL
CREAT SERPL-MCNC: 0.96 MG/DL (ref 0.67–1.17)
DEPRECATED HCO3 PLAS-SCNC: 25 MMOL/L (ref 22–29)
EGFRCR SERPLBLD CKD-EPI 2021: 77 ML/MIN/1.73M2
FASTING STATUS PATIENT QL REPORTED: NORMAL
GLUCOSE SERPL-MCNC: 92 MG/DL (ref 70–99)
HDLC SERPL-MCNC: 54 MG/DL
LDLC SERPL CALC-MCNC: 66 MG/DL
NONHDLC SERPL-MCNC: 83 MG/DL
POTASSIUM SERPL-SCNC: 4.2 MMOL/L (ref 3.4–5.3)
PROT SERPL-MCNC: 7.1 G/DL (ref 6.4–8.3)
SODIUM SERPL-SCNC: 137 MMOL/L (ref 135–145)
TRIGL SERPL-MCNC: 86 MG/DL

## 2024-05-20 ENCOUNTER — LAB REQUISITION (OUTPATIENT)
Dept: LAB | Facility: CLINIC | Age: 84
End: 2024-05-20

## 2024-05-20 ENCOUNTER — TRANSFERRED RECORDS (OUTPATIENT)
Dept: HEALTH INFORMATION MANAGEMENT | Facility: CLINIC | Age: 84
End: 2024-05-20
Payer: COMMERCIAL

## 2024-05-20 DIAGNOSIS — R42 DIZZINESS AND GIDDINESS: ICD-10-CM

## 2024-05-20 DIAGNOSIS — I35.8 OTHER NONRHEUMATIC AORTIC VALVE DISORDERS: ICD-10-CM

## 2024-05-20 PROCEDURE — 85025 COMPLETE CBC W/AUTO DIFF WBC: CPT | Performed by: FAMILY MEDICINE

## 2024-05-20 PROCEDURE — 83880 ASSAY OF NATRIURETIC PEPTIDE: CPT | Performed by: FAMILY MEDICINE

## 2024-05-21 LAB
BASOPHILS # BLD AUTO: 0.1 10E3/UL (ref 0–0.2)
BASOPHILS NFR BLD AUTO: 1 %
EOSINOPHIL # BLD AUTO: 0.1 10E3/UL (ref 0–0.7)
EOSINOPHIL NFR BLD AUTO: 2 %
ERYTHROCYTE [DISTWIDTH] IN BLOOD BY AUTOMATED COUNT: 14.7 % (ref 10–15)
HCT VFR BLD AUTO: 36.1 % (ref 40–53)
HGB BLD-MCNC: 12 G/DL (ref 13.3–17.7)
IMM GRANULOCYTES # BLD: 0 10E3/UL
IMM GRANULOCYTES NFR BLD: 0 %
LYMPHOCYTES # BLD AUTO: 2.1 10E3/UL (ref 0.8–5.3)
LYMPHOCYTES NFR BLD AUTO: 34 %
MCH RBC QN AUTO: 30.8 PG (ref 26.5–33)
MCHC RBC AUTO-ENTMCNC: 33.2 G/DL (ref 31.5–36.5)
MCV RBC AUTO: 93 FL (ref 78–100)
MONOCYTES # BLD AUTO: 0.8 10E3/UL (ref 0–1.3)
MONOCYTES NFR BLD AUTO: 12 %
NEUTROPHILS # BLD AUTO: 3.2 10E3/UL (ref 1.6–8.3)
NEUTROPHILS NFR BLD AUTO: 51 %
NRBC # BLD AUTO: 0 10E3/UL
NRBC BLD AUTO-RTO: 0 /100
PLATELET # BLD AUTO: 203 10E3/UL (ref 150–450)
RBC # BLD AUTO: 3.89 10E6/UL (ref 4.4–5.9)
WBC # BLD AUTO: 6.3 10E3/UL (ref 4–11)

## 2024-05-22 LAB — NT-PROBNP SERPL-MCNC: 176 PG/ML (ref 0–1800)

## 2024-05-24 ENCOUNTER — HOSPITAL ENCOUNTER (EMERGENCY)
Facility: HOSPITAL | Age: 84
Discharge: HOME | End: 2024-05-24
Attending: EMERGENCY MEDICINE | Admitting: EMERGENCY MEDICINE
Payer: COMMERCIAL

## 2024-05-24 ENCOUNTER — APPOINTMENT (OUTPATIENT)
Dept: CT IMAGING | Facility: HOSPITAL | Age: 84
End: 2024-05-24
Attending: EMERGENCY MEDICINE
Payer: COMMERCIAL

## 2024-05-24 ENCOUNTER — APPOINTMENT (OUTPATIENT)
Dept: RADIOLOGY | Facility: HOSPITAL | Age: 84
End: 2024-05-24
Attending: EMERGENCY MEDICINE
Payer: COMMERCIAL

## 2024-05-24 VITALS
SYSTOLIC BLOOD PRESSURE: 135 MMHG | HEART RATE: 67 BPM | RESPIRATION RATE: 26 BRPM | BODY MASS INDEX: 32.77 KG/M2 | HEIGHT: 63 IN | TEMPERATURE: 98.3 F | DIASTOLIC BLOOD PRESSURE: 84 MMHG | OXYGEN SATURATION: 96 %

## 2024-05-24 DIAGNOSIS — E86.0 DEHYDRATION: ICD-10-CM

## 2024-05-24 DIAGNOSIS — R91.1 PULMONARY NODULE: ICD-10-CM

## 2024-05-24 DIAGNOSIS — R06.09 EXERTIONAL DYSPNEA: ICD-10-CM

## 2024-05-24 PROBLEM — I10 ESSENTIAL HYPERTENSION: Status: ACTIVE | Noted: 2024-05-24

## 2024-05-24 PROBLEM — M25.519 PAIN IN JOINT, SHOULDER REGION: Status: ACTIVE | Noted: 2024-05-24

## 2024-05-24 PROBLEM — E66.9 OBESITY: Status: ACTIVE | Noted: 2024-05-24

## 2024-05-24 LAB
ANION GAP SERPL CALCULATED.3IONS-SCNC: 12 MMOL/L (ref 7–15)
BUN SERPL-MCNC: 13.3 MG/DL (ref 8–23)
CALCIUM SERPL-MCNC: 9.1 MG/DL (ref 8.8–10.2)
CHLORIDE SERPL-SCNC: 95 MMOL/L (ref 98–107)
CREAT SERPL-MCNC: 1.03 MG/DL (ref 0.67–1.17)
D DIMER PPP FEU-MCNC: 0.96 UG/ML FEU (ref 0–0.5)
DEPRECATED HCO3 PLAS-SCNC: 28 MMOL/L (ref 22–29)
EGFRCR SERPLBLD CKD-EPI 2021: 71 ML/MIN/1.73M2
ERYTHROCYTE [DISTWIDTH] IN BLOOD BY AUTOMATED COUNT: 14.6 % (ref 10–15)
GLUCOSE SERPL-MCNC: 112 MG/DL (ref 70–99)
HCT VFR BLD AUTO: 40.3 % (ref 40–53)
HGB BLD-MCNC: 13.2 G/DL (ref 13.3–17.7)
HOLD SPECIMEN: NORMAL
HOLD SPECIMEN: NORMAL
MCH RBC QN AUTO: 30.4 PG (ref 26.5–33)
MCHC RBC AUTO-ENTMCNC: 32.8 G/DL (ref 31.5–36.5)
MCV RBC AUTO: 93 FL (ref 78–100)
NT-PROBNP SERPL-MCNC: 101 PG/ML (ref 0–1800)
PLATELET # BLD AUTO: 291 10E3/UL (ref 150–450)
POTASSIUM SERPL-SCNC: 3.9 MMOL/L (ref 3.4–5.3)
RBC # BLD AUTO: 4.34 10E6/UL (ref 4.4–5.9)
SODIUM SERPL-SCNC: 135 MMOL/L (ref 135–145)
TROPONIN T SERPL HS-MCNC: 11 NG/L
WBC # BLD AUTO: 7 10E3/UL (ref 4–11)

## 2024-05-24 PROCEDURE — 83880 ASSAY OF NATRIURETIC PEPTIDE: CPT | Performed by: EMERGENCY MEDICINE

## 2024-05-24 PROCEDURE — 36415 COLL VENOUS BLD VENIPUNCTURE: CPT | Performed by: EMERGENCY MEDICINE

## 2024-05-24 PROCEDURE — 99285 EMERGENCY DEPT VISIT HI MDM: CPT | Mod: 25

## 2024-05-24 PROCEDURE — 71275 CT ANGIOGRAPHY CHEST: CPT | Mod: XS

## 2024-05-24 PROCEDURE — 84484 ASSAY OF TROPONIN QUANT: CPT | Performed by: EMERGENCY MEDICINE

## 2024-05-24 PROCEDURE — 93005 ELECTROCARDIOGRAM TRACING: CPT | Performed by: EMERGENCY MEDICINE

## 2024-05-24 PROCEDURE — 85027 COMPLETE CBC AUTOMATED: CPT | Performed by: EMERGENCY MEDICINE

## 2024-05-24 PROCEDURE — 250N000011 HC RX IP 250 OP 636: Performed by: EMERGENCY MEDICINE

## 2024-05-24 PROCEDURE — 85379 FIBRIN DEGRADATION QUANT: CPT | Performed by: EMERGENCY MEDICINE

## 2024-05-24 PROCEDURE — 71275 CT ANGIOGRAPHY CHEST: CPT

## 2024-05-24 PROCEDURE — 71046 X-RAY EXAM CHEST 2 VIEWS: CPT

## 2024-05-24 PROCEDURE — 80048 BASIC METABOLIC PNL TOTAL CA: CPT | Performed by: EMERGENCY MEDICINE

## 2024-05-24 RX ORDER — IOPAMIDOL 755 MG/ML
90 INJECTION, SOLUTION INTRAVASCULAR ONCE
Status: COMPLETED | OUTPATIENT
Start: 2024-05-24 | End: 2024-05-24

## 2024-05-24 RX ORDER — IOPAMIDOL 755 MG/ML
75 INJECTION, SOLUTION INTRAVASCULAR ONCE
Status: COMPLETED | OUTPATIENT
Start: 2024-05-24 | End: 2024-05-24

## 2024-05-24 RX ADMIN — IOPAMIDOL 75 ML: 755 INJECTION, SOLUTION INTRAVENOUS at 15:14

## 2024-05-24 RX ADMIN — IOPAMIDOL 90 ML: 755 INJECTION, SOLUTION INTRAVENOUS at 13:55

## 2024-05-24 ASSESSMENT — ACTIVITIES OF DAILY LIVING (ADL)
ADLS_ACUITY_SCORE: 35

## 2024-05-24 ASSESSMENT — COLUMBIA-SUICIDE SEVERITY RATING SCALE - C-SSRS
1. IN THE PAST MONTH, HAVE YOU WISHED YOU WERE DEAD OR WISHED YOU COULD GO TO SLEEP AND NOT WAKE UP?: NO
6. HAVE YOU EVER DONE ANYTHING, STARTED TO DO ANYTHING, OR PREPARED TO DO ANYTHING TO END YOUR LIFE?: NO
2. HAVE YOU ACTUALLY HAD ANY THOUGHTS OF KILLING YOURSELF IN THE PAST MONTH?: NO

## 2024-05-24 NOTE — ED TRIAGE NOTES
Patient arrives with daughter from senior housing with increased SOB for greater than 5 weeks. Reports blurry vision and dizziness that started 4-5 weeks ago. SOB is getting worse and making it difficult to ambulate short distances. 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 Is scheduled for echo on Thursday next week. Currently taking diuretic.     Triage Assessment (Adult)       Row Name 05/24/24 1102          Triage Assessment    Airway WDL WDL        Respiratory WDL    Respiratory WDL X;all     Rhythm/Pattern, Respiratory shortness of breath        Skin Circulation/Temperature WDL    Skin Circulation/Temperature WDL WDL        Cardiac WDL    Cardiac WDL WDL        Peripheral/Neurovascular WDL    Peripheral Neurovascular WDL WDL        Cognitive/Neuro/Behavioral WDL    Cognitive/Neuro/Behavioral WDL WDL

## 2024-05-24 NOTE — DISCHARGE INSTRUCTIONS
Please take your Lasix dose every other day until you follow-up with cardiology.  I placed a referral, 70 should call on Tuesday to help set up an appointment.    No sign of heart attack, or heart failure.      CT scan of the chest, abdomen showed small pulmonary nodules which could be follow-up if needed in the next 6 months to a year.  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  No change in any medications based on those.  Your primary care doctor can help with any follow-up and monitoring of these areas if needed.    Please keep your appointment next Thursday with your echocardiogram.    If you find your shortness of breath, unsteadiness and weakness in your feet continues to get progressively worse day by day and you are unsure if you will make it to your appointment on Thursday, please come back to the emergency department, you need to be admitted to the hospital for monitoring, continued cardiac evaluation.  Otherwise you are absolutely safe to continue with this plan for follow-up on Thursday.

## 2024-05-24 NOTE — ED PROVIDER NOTES
EMERGENCY DEPARTMENT ENCOUNTER      NAME: Jonathan Blackwell  AGE: 83 year old male  YOB: 1940  MRN: [id number]  EVALUATION DATE & TIME: 5/24/2024 11:22 AM    PCP: Gulshan Finnegan    ED PROVIDER: Cedric Gloria M.D.      Chief Complaint   Patient presents with    Dizziness    Shortness of Breath         FINAL IMPRESSION:  1. Exertional dyspnea    2. Dehydration    3. Pulmonary nodule          ED COURSE & MEDICAL DECISION MAKING:    Pertinent Labs & Imaging studies reviewed below.  All EKGs below represent my independent interpretation.   ED Course as of 05/24/24 1801   Fri May 24, 2024   1157 Patient is an 83-year-old gentleman who presents with progression of exertional shortness of breath over the last few months.  He was started on Lasix 3 days ago, has had good urine output, but thinks his breathing was even a little bit worse today.  Extremity edema has improved on the left lower leg but is persistent on the right.  No chest pain.  On arrival he is comfortable appearing, has normal oxygenation at 94%.  Otherwise normal vital signs.  Lung sounds are clear.  Plan to screen for pneumonia, CHF, PE.   1158 EKG shows sinus rhythm with a rate of 65.  No acute ischemic ST or T wave morphology.  Normal axis, normal intervals, no prior EKG for comparison.  Impression: Normal sinus rhythm with a rate of 65   1219 D-Dimer Quantitative(!): 0.96  Minimally elevated   1254 Chest XR,  PA & LAT  Cardiac silhouette is normal in size. Moderate to severe atheromatous calcifications in the aortic arch and descending thoracic aorta. Mediastinal borders are well-defined.     Normal lung vascularity. Lungs are slightly underinflated. No interstitial or alveolar opacities.     No pleural effusion or pleural thickening.     Status post left shoulder replacement. Disc space narrowing and marginal osteophytes throughout the thoracic spine. There is a moderate mid thoracic compression deformity.   1417 CT Chest Pulmonary  Embolism w Contrast  1.  No pulmonary embolism identified.     2.  Mildly enlarged ascending aorta (4.3 cm), though suboptimal opacification to assess for dissection. Aortic valvular thickening and calcifications.     3.  Incompletely seen aneurysmal abdominal aorta with either chronic calcified ulcerative plaque or chronic dissection. Recommend dedicated follow-up CTA abdomen-pelvis.     4.  Severe coronary calcifications.     5.  Mild basilar predominant atelectasis. No consolidation.     6.  Left upper lobe 4 mm nodule; optional 12 month follow up chest CT could be considered.     7.  Slightly lobulated liver contour, cannot exclude underlying fibrosis.     8.  Findings of chronic pancreatitis.     9.  Bony demineralization. Multilevel age-indeterminate vertebral compression fractures.   1552 CTA Chest Abdomen Pelvis w Contrast  1.  Mild fusiform enlargement mid ascending aorta measuring 4.2 cm in diameter. Moderate mixed plaque in the thoracic and abdominal aorta.  2.  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 No findings to suggest acute vasculopathy.  3.  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  4.  Diverticulosis of the colon but no diverticulitis.  5.  Multiple small bladder diverticula and a 2 cm ureterocele at the right ureterovesicular junction.  6.  Severe coronary calcifications.     ACS ruled out. I had a long conversation about the patient's symptoms with himself, his daughter and his wife.  There is no sign of pulmonary edema, PE. He is comfortable at rest, he does not appear to be volume overload.  His symptoms seem to be getting worse over the last few days on lasix and I recommended scaling back on his Lasix to every other day to make sure that  dehydration isn't playing a role.  I do not see a role for inpatient management or evaluation of his symptoms at this time.    Otherwise he has an outpatient echocardiogram scheduled in 6 days, and if he gets any worse into that point he will return to the emergency department.     Otherwise with the findings on the CTA of the chest abdomen pelvis, there are no acute findings and his blood pressure is under good control, and this is safe for outpatient follow-up with PCP for future aortic monitoring.    Additional ED Course Timestamps:  11:48 AM I met with the patient, gathered information, history, and performed initial exam.     Medical Decision Making  Obtained supplemental history:Supplemental history obtained?: No  Reviewed external records: External records reviewed?: Documented in chart  Care impacted by chronic illness:Hyperlipidemia and Hypertension  Care significantly affected by social determinants of health:N/A  Did you consider but not order tests?: Work up considered but not performed and documented in chart, if applicable  Did you interpret images independently?: Independent interpretation of ECG and images noted in documentation, when applicable.  Consultation discussion with other provider: Phone conversation with consultants will be documented in the ED Course      At the conclusion of the encounter I discussed the results of all of the tests and the disposition. The questions were answered. The patient or family acknowledged understanding and was agreeable with the care plan.       MEDICATIONS GIVEN IN THE EMERGENCY:  Medications   iopamidol (ISOVUE-370) solution 90 mL (90 mLs Intravenous $Given 5/24/24 1355)   iopamidol (ISOVUE-370) solution 75 mL (75 mLs Intravenous $Given 5/24/24 1514)         NEW PRESCRIPTIONS STARTED AT TODAY'S ER VISIT  Discharge Medication List as of 5/24/2024  4:30 PM             =================================================================    HPI    Jonathan Blackwell  "is a 83 year old male who presents to this ED for evaluation of shortness of breath.     Patient reports progressively worsening shortness of breath recently where he feels out of breath by walking across his apartment to the living room. He reports blurry vision, mild dizziness, and mild swelling in his bilateral lower extremities. Patient notes that he initially noticed the swelling in his lower left leg, but states that now it is more in his right leg. Denies pain in his lower extremities or knees. Patient was started on a water pill, Lasix,  ~3 days ago.     Patient's daughter examined him as she is in the medical field, and she told him that she didn't like what she was hearing as it sounded like a murmer. Patient has history of murmer in hat past.     Anna Marie chest pain, fever, or abdominal pain. Denies history of DVT. Not on blood thinners.       VITALS:  /84   Pulse 67   Temp 98.3  F (36.8  C) (Temporal)   Resp 26   Ht 1.6 m (5' 3\")   SpO2 96%   BMI 32.77 kg/m      PHYSICAL EXAM    Constitutional: Well developed, well nourished. Comfortable appearing.  HENT: Atraumatic, mucous membranes moist, nose normal. Neck- Supple, gross ROM intact.   Eyes: Pupils mid-range, conjunctiva without injection, no discharge.   Respiratory: Clear to auscultation bilaterally, no respiratory distress, no wheezing, speaks full sentences easily. No cough.  Cardiovascular: Normal heart rate, regular rhythm, no murmurs.   GI: Soft, no tenderness to deep palpation in all quadrants, no masses.  Musculoskeletal: Moving all 4 extremities intentionally and without pain. No obvious deformity. 1+ pitting edema on left lower leg and 2+ in right lower leg. To erythema or tenderness.  Skin: Warm, dry, no rash.  Neurologic: Alert & oriented x 3, cranial nerves grossly intact.  Psychiatric: Affect normal, cooperative.      PROCEDURES:   N/A      Lashae BARBER  am serving as a scribe to document services personally performed by " Dr. Cedric Gloria based on my observation and the provider's statements to me. I, Cedric Gloria MD attest that Lashae Je  is acting in a scribe capacity, has observed my performance of the services and has documented them in accordance with my direction.    Cedric Gloria M.D.  Emergency Medicine  [facility] EMERGENCY DEPARTMENT  [facility] [facility] [address]  [phone]  Dept: [phone]     Cedric Gloria MD  05/24/24 1202

## 2024-05-24 NOTE — ED NOTES
Pt presents for evaluation of worsening chronic SOB. He notes SOB for 4-5 weeks. Mild lower extremity swelling. Pt reports some worsening SOB with ADLs.   SKIN: WNL.   HEENT: Normocephalic, PERRL, alert and oriented x 3.   CHEST: Symmetrical rise, breath sounds diminished bilaterally. No reported CP or SOB at rest. Pt ambulated to bathroom without clinical signs of SOB.   ABD: NTND. No reported N&V or diarrhea.  EXT: UREÑA x 4  Rest of exam unremarkable.

## 2024-05-25 LAB
ATRIAL RATE - MUSE: 65 BPM
DIASTOLIC BLOOD PRESSURE - MUSE: NORMAL MMHG
INTERPRETATION ECG - MUSE: NORMAL
P AXIS - MUSE: 49 DEGREES
PR INTERVAL - MUSE: 178 MS
QRS DURATION - MUSE: 92 MS
QT - MUSE: 416 MS
QTC - MUSE: 432 MS
R AXIS - MUSE: 23 DEGREES
SYSTOLIC BLOOD PRESSURE - MUSE: NORMAL MMHG
T AXIS - MUSE: 43 DEGREES
VENTRICULAR RATE- MUSE: 65 BPM

## 2024-05-29 ENCOUNTER — MEDICAL CORRESPONDENCE (OUTPATIENT)
Dept: HEALTH INFORMATION MANAGEMENT | Facility: CLINIC | Age: 84
End: 2024-05-29
Payer: COMMERCIAL

## 2024-05-29 DIAGNOSIS — D50.8 IRON DEFICIENCY ANEMIA SECONDARY TO INADEQUATE DIETARY IRON INTAKE: Primary | ICD-10-CM

## 2024-05-29 RX ORDER — MEPERIDINE HYDROCHLORIDE 25 MG/ML
25 INJECTION INTRAMUSCULAR; INTRAVENOUS; SUBCUTANEOUS EVERY 30 MIN PRN
Status: CANCELLED | OUTPATIENT
Start: 2024-05-30

## 2024-05-29 RX ORDER — ALBUTEROL SULFATE 0.83 MG/ML
2.5 SOLUTION RESPIRATORY (INHALATION)
Status: CANCELLED | OUTPATIENT
Start: 2024-05-30

## 2024-05-29 RX ORDER — HEPARIN SODIUM (PORCINE) LOCK FLUSH IV SOLN 100 UNIT/ML 100 UNIT/ML
5 SOLUTION INTRAVENOUS
Status: CANCELLED | OUTPATIENT
Start: 2024-05-30

## 2024-05-29 RX ORDER — HEPARIN SODIUM,PORCINE 10 UNIT/ML
5-20 VIAL (ML) INTRAVENOUS DAILY PRN
Status: CANCELLED | OUTPATIENT
Start: 2024-05-30

## 2024-05-29 RX ORDER — ALBUTEROL SULFATE 90 UG/1
1-2 AEROSOL, METERED RESPIRATORY (INHALATION)
Status: CANCELLED
Start: 2024-05-30

## 2024-05-29 RX ORDER — EPINEPHRINE 1 MG/ML
0.3 INJECTION, SOLUTION, CONCENTRATE INTRAVENOUS EVERY 5 MIN PRN
Status: CANCELLED | OUTPATIENT
Start: 2024-05-30

## 2024-05-29 RX ORDER — DIPHENHYDRAMINE HYDROCHLORIDE 50 MG/ML
50 INJECTION INTRAMUSCULAR; INTRAVENOUS
Status: CANCELLED
Start: 2024-05-30

## 2024-05-29 RX ORDER — METHYLPREDNISOLONE SODIUM SUCCINATE 125 MG/2ML
125 INJECTION, POWDER, LYOPHILIZED, FOR SOLUTION INTRAMUSCULAR; INTRAVENOUS
Status: CANCELLED
Start: 2024-05-30

## 2024-05-30 ENCOUNTER — TRANSFERRED RECORDS (OUTPATIENT)
Dept: HEALTH INFORMATION MANAGEMENT | Facility: CLINIC | Age: 84
End: 2024-05-30
Payer: COMMERCIAL

## 2024-05-30 LAB — EJECTION FRACTION: NORMAL %

## 2024-06-06 ENCOUNTER — INFUSION THERAPY VISIT (OUTPATIENT)
Dept: INFUSION THERAPY | Facility: HOSPITAL | Age: 84
End: 2024-06-06
Attending: FAMILY MEDICINE
Payer: COMMERCIAL

## 2024-06-06 VITALS
SYSTOLIC BLOOD PRESSURE: 119 MMHG | HEART RATE: 71 BPM | TEMPERATURE: 97.4 F | RESPIRATION RATE: 16 BRPM | OXYGEN SATURATION: 97 % | DIASTOLIC BLOOD PRESSURE: 71 MMHG

## 2024-06-06 DIAGNOSIS — D50.8 IRON DEFICIENCY ANEMIA SECONDARY TO INADEQUATE DIETARY IRON INTAKE: Primary | ICD-10-CM

## 2024-06-06 PROCEDURE — 258N000003 HC RX IP 258 OP 636: Mod: JZ | Performed by: FAMILY MEDICINE

## 2024-06-06 PROCEDURE — 96365 THER/PROPH/DIAG IV INF INIT: CPT

## 2024-06-06 PROCEDURE — 250N000011 HC RX IP 250 OP 636: Performed by: FAMILY MEDICINE

## 2024-06-06 PROCEDURE — 96366 THER/PROPH/DIAG IV INF ADDON: CPT

## 2024-06-06 RX ORDER — ALBUTEROL SULFATE 0.83 MG/ML
2.5 SOLUTION RESPIRATORY (INHALATION)
Status: DISCONTINUED | OUTPATIENT
Start: 2024-06-06 | End: 2024-06-06 | Stop reason: HOSPADM

## 2024-06-06 RX ORDER — MEPERIDINE HYDROCHLORIDE 25 MG/ML
25 INJECTION INTRAMUSCULAR; INTRAVENOUS; SUBCUTANEOUS EVERY 30 MIN PRN
Status: CANCELLED | OUTPATIENT
Start: 2024-06-08

## 2024-06-06 RX ORDER — DIPHENHYDRAMINE HYDROCHLORIDE 50 MG/ML
50 INJECTION INTRAMUSCULAR; INTRAVENOUS
Status: DISCONTINUED | OUTPATIENT
Start: 2024-06-06 | End: 2024-06-06 | Stop reason: HOSPADM

## 2024-06-06 RX ORDER — ALBUTEROL SULFATE 0.83 MG/ML
2.5 SOLUTION RESPIRATORY (INHALATION)
Status: CANCELLED | OUTPATIENT
Start: 2024-06-08

## 2024-06-06 RX ORDER — ALBUTEROL SULFATE 90 UG/1
1-2 AEROSOL, METERED RESPIRATORY (INHALATION)
Status: DISCONTINUED | OUTPATIENT
Start: 2024-06-06 | End: 2024-06-06 | Stop reason: HOSPADM

## 2024-06-06 RX ORDER — ALBUTEROL SULFATE 90 UG/1
1-2 AEROSOL, METERED RESPIRATORY (INHALATION)
Status: CANCELLED
Start: 2024-06-08

## 2024-06-06 RX ORDER — DIPHENHYDRAMINE HYDROCHLORIDE 50 MG/ML
50 INJECTION INTRAMUSCULAR; INTRAVENOUS
Status: CANCELLED
Start: 2024-06-08

## 2024-06-06 RX ORDER — EPINEPHRINE 1 MG/ML
0.3 INJECTION, SOLUTION INTRAMUSCULAR; SUBCUTANEOUS EVERY 5 MIN PRN
Status: DISCONTINUED | OUTPATIENT
Start: 2024-06-06 | End: 2024-06-06 | Stop reason: HOSPADM

## 2024-06-06 RX ORDER — HEPARIN SODIUM,PORCINE 10 UNIT/ML
5-20 VIAL (ML) INTRAVENOUS DAILY PRN
Status: CANCELLED | OUTPATIENT
Start: 2024-06-08

## 2024-06-06 RX ORDER — EPINEPHRINE 1 MG/ML
0.3 INJECTION, SOLUTION INTRAMUSCULAR; SUBCUTANEOUS EVERY 5 MIN PRN
Status: CANCELLED | OUTPATIENT
Start: 2024-06-08

## 2024-06-06 RX ORDER — METHYLPREDNISOLONE SODIUM SUCCINATE 125 MG/2ML
125 INJECTION, POWDER, LYOPHILIZED, FOR SOLUTION INTRAMUSCULAR; INTRAVENOUS
Status: CANCELLED
Start: 2024-06-08

## 2024-06-06 RX ORDER — HEPARIN SODIUM (PORCINE) LOCK FLUSH IV SOLN 100 UNIT/ML 100 UNIT/ML
5 SOLUTION INTRAVENOUS
Status: CANCELLED | OUTPATIENT
Start: 2024-06-08

## 2024-06-06 RX ORDER — METHYLPREDNISOLONE SODIUM SUCCINATE 125 MG/2ML
125 INJECTION, POWDER, LYOPHILIZED, FOR SOLUTION INTRAMUSCULAR; INTRAVENOUS
Status: DISCONTINUED | OUTPATIENT
Start: 2024-06-06 | End: 2024-06-06 | Stop reason: HOSPADM

## 2024-06-06 RX ORDER — MEPERIDINE HYDROCHLORIDE 25 MG/ML
25 INJECTION INTRAMUSCULAR; INTRAVENOUS; SUBCUTANEOUS EVERY 30 MIN PRN
Status: DISCONTINUED | OUTPATIENT
Start: 2024-06-06 | End: 2024-06-06 | Stop reason: HOSPADM

## 2024-06-06 RX ADMIN — IRON SUCROSE 300 MG: 20 INJECTION, SOLUTION INTRAVENOUS at 11:18

## 2024-06-06 RX ADMIN — SODIUM CHLORIDE 250 ML: 9 INJECTION, SOLUTION INTRAVENOUS at 11:16

## 2024-06-06 ASSESSMENT — PAIN SCALES - GENERAL: PAINLEVEL: NO PAIN (0)

## 2024-06-06 NOTE — PROGRESS NOTES
Infusion Nursing Note:  Jonathan Blackwell presents today for Venofer 1/3.    Patient seen by provider today: No   present during visit today: Not Applicable.    Note: Jonathan arrived into the infusion center ambulatory in accompanied by his daughter, he came in a stable condition and states he is feeling well except that he is always tired and wants to sleep all the time in the recent days.Plan of care was reviewed, he has not taken Iron infusion before but has had numerous blood transfusion. Pateint education on venofer reviewed with them, they verbalized understanding of his plan of care and return to clinic    Intravenous Access:  Peripheral IV placed.    Treatment Conditions:  Not Applicable.    Post Infusion Assessment:  Patient tolerated infusion without incident.  Site patent and intact, free from redness, edema or discomfort.  No evidence of extravasations.  Access discontinued per protocol.     Discharge Plan:   Discharge instructions reviewed with: Patient and Family.  Patient and/or family verbalized understanding of discharge instructions and all questions answered.  Patient discharged in stable condition accompanied by: daughter.  Departure Mode: Ambulatory.    Kimberly Brito RN

## 2024-06-07 ENCOUNTER — OFFICE VISIT (OUTPATIENT)
Dept: CARDIOLOGY | Facility: CLINIC | Age: 84
End: 2024-06-07
Attending: EMERGENCY MEDICINE
Payer: COMMERCIAL

## 2024-06-07 VITALS
BODY MASS INDEX: 33.48 KG/M2 | SYSTOLIC BLOOD PRESSURE: 92 MMHG | HEART RATE: 72 BPM | WEIGHT: 189 LBS | DIASTOLIC BLOOD PRESSURE: 60 MMHG | RESPIRATION RATE: 14 BRPM

## 2024-06-07 DIAGNOSIS — I10 PRIMARY HYPERTENSION: ICD-10-CM

## 2024-06-07 DIAGNOSIS — I95.1 ORTHOSTATIC HYPOTENSION: ICD-10-CM

## 2024-06-07 DIAGNOSIS — R06.09 EXERTIONAL DYSPNEA: Primary | ICD-10-CM

## 2024-06-07 DIAGNOSIS — I87.2 VENOUS (PERIPHERAL) INSUFFICIENCY: ICD-10-CM

## 2024-06-07 PROCEDURE — 99204 OFFICE O/P NEW MOD 45 MIN: CPT | Performed by: STUDENT IN AN ORGANIZED HEALTH CARE EDUCATION/TRAINING PROGRAM

## 2024-06-07 PROCEDURE — G2211 COMPLEX E/M VISIT ADD ON: HCPCS | Performed by: STUDENT IN AN ORGANIZED HEALTH CARE EDUCATION/TRAINING PROGRAM

## 2024-06-07 RX ORDER — GABAPENTIN 300 MG/1
300 CAPSULE ORAL AT BEDTIME
COMMUNITY
Start: 2024-03-25

## 2024-06-07 RX ORDER — LOSARTAN POTASSIUM 50 MG/1
50 TABLET ORAL DAILY
Qty: 90 TABLET | Refills: 3 | Status: SHIPPED | OUTPATIENT
Start: 2024-06-07

## 2024-06-07 RX ORDER — FUROSEMIDE 20 MG
20 TABLET ORAL EVERY OTHER DAY
COMMUNITY
Start: 2024-05-20 | End: 2024-06-12

## 2024-06-07 NOTE — PATIENT INSTRUCTIONS
It was a pleasure meeting you today    In summary:    We will stop hydrochlorothiazide-losartan and furosemide.  We will start you just on losartan by itself.  Please check your blood pressures at home.  We will get a nuclear stress test to evaluate your symptoms.    Please call my nurse Michel Villalpando at [phone] if you have any questions or issues.    We will schedule a follow up visit in  4 months      Ignacio Mclaughlin DO [facility]  Non-invasive Cardiologist  [facility]

## 2024-06-07 NOTE — LETTER
6/7/2024    Gulshan Finnegan MD  [address]  [place] [address]    RE: Jonathan Lynnnadine       Dear Colleague,     I had the pleasure of seeing Jonathan Blackwell in the [facility].    [facility] HE[facility]   [address]  [place] [address]   [email or web address]   OFFICE: [phone]     CARDIOLOGY CLINIC NOTE     Thank you, Gulshan Beckwith, for asking the [facility] Heart Care team to see Mr. Jonathan Blackwell to evaluate New Patient        History of Present Illness   Mr. Jonathan Blackwell is a 83 year old male with a significant past history of HTN, HLD, CAC seen on CT scan,  who presents for evaluation of lightheadedness, blurry vision, and dyspnea on exertion.  The patient notes for the last month he has been experiencing lightheadedness on position changes.  He notes this symptom lasts for about 10 seconds.  He has not fallen or passed out.  He has also been noticing some leg swelling and so was started on lasix by his PCP.  Echocardiogram was recently done.  The patient also notes he gets fatigued and winded with exertion.  This has been ongoing for a year.  He denies any other exertional symptoms.  No chest pain.           Medications  Allergies   Current Outpatient Medications   Medication Sig Dispense Refill    cholecalciferol, vitamin D3, 1,000 unit tablet [CHOLECALCIFEROL, VITAMIN D3, 1,000 UNIT TABLET] Take 2,000 Units by mouth daily.      losartan-hydrochlorothiazide (HYZAAR) 50-12.5 MG tablet [LOSARTAN-HYDROCHLOROTHIAZIDE (HYZAAR) 50-12.5 MG PER TABLET] TAKE ONE TABLET BY MOUTH ONE TIME DAILY 90 tablet 0    lovastatin (MEVACOR) 40 MG tablet TAKE HALF TABLET BY MOUTH AT BEDTIME 45 tablet 1    meloxicam (MOBIC) 7.5 MG tablet [MELOXICAM (MOBIC) 7.5 MG TABLET]       MULTIVITAMIN ORAL [MULTIVITAMIN ORAL] Take 1 tablet by mouth 3 (three) times a week.       tamsulosin (FLOMAX) 0.4 MG capsule [TAMSULOSIN (FLOMAX) 0.4 MG CAP]  "TAKE ONE CAPSULE BY MOUTH AT BEDTIME 90 capsule 0    vit C/vit E ac/lut/copper/zinc (PRESERVISION LUTEIN ORAL) [VIT C/VIT E AC/LUT/COPPER/ZINC (PRESERVISION LUTEIN ORAL)] Take by mouth.        Allergies   Allergen Reactions    Lisinopril Other (See Comments)     Swollen lips, Other reaction(s): HIVES, LIP SWELLING, HIVES, LIP SWELLING, Swollen lips, Other reaction(s): HIVES, LIP SWELLING, HIVES, LIP SWELLING        Physical Examination Review of Systems   Vitals: There were no vitals taken for this visit.  BMI= There is no height or weight on file to calculate BMI.  Wt Readings from Last 3 Encounters:   10/04/22 83.9 kg (185 lb)   21 88.3 kg (194 lb 9.6 oz)   20 87.2 kg (192 lb 4.8 oz)       General: pleasant male. No acute distress.   Neck: No JVD  Lungs: clear to auscultation  COR:  regular rate and rhythm, No murmurs, rubs, or gallops  Extrem: trace edema        Please refer above for cardiac ROS details.       Past History     Family History:   Family History   Problem Relation Age of Onset    Other - See Comments Mother         Cause of death unclear to him.    Dementia Father         \"Might have had a little bit of Alzheimer's coming on.\"    Other - See Comments Father         Cause of death unclear to him.    Aneurysm Brother         SAH from brain aneurysm.    Diabetes Maternal Grandmother     No Known Problems Daughter     No Known Problems Son         Social History:   Social History     Socioeconomic History    Marital status:      Spouse name: Not on file    Number of children: Not on file    Years of education: Not on file    Highest education level: Not on file   Occupational History    Not on file   Tobacco Use    Smoking status: Former     Current packs/day: 0.00     Types: Cigarettes     Quit date: 1995     Years since quittin.9    Smokeless tobacco: Never    Tobacco comments:     Quit date was in .  Precise date is nominal.   Substance and Sexual Activity    Alcohol " use: Yes     Alcohol/week: 1.0 standard drink of alcohol     Comment: Alcoholic Drinks/day: Beer when golfing.  A little wine.  Does not drink regularly.    Drug use: No    Sexual activity: Not on file   Other Topics Concern    Not on file   Social History Narrative    Not on file     Social Determinants of Health     Financial Resource Strain: Not on file   Food Insecurity: Not on file   Transportation Needs: Not on file   Physical Activity: Not on file   Stress: Not on file   Social Connections: Not on file   Interpersonal Safety: Not on file   Housing Stability: Not on file            Lab Results    Chemistry/lipid CBC Cardiac Enzymes/BNP/TSH/INR   Lab Results   Component Value Date    CHOL 137 04/08/2024    HDL 54 04/08/2024    TRIG 86 04/08/2024    BUN 13.3 05/24/2024     05/24/2024    CO2 28 05/24/2024    Lab Results   Component Value Date    WBC 7.0 05/24/2024    HGB 13.2 (L) 05/24/2024    HCT 40.3 05/24/2024    MCV 93 05/24/2024     05/24/2024    Lab Results   Component Value Date    TSH 2.55 03/05/2020          Cardiac Problems and Cardiac Diagnostics     Most Recent Cardiac testing:  ECG Personal interpretation  5/24/2024  NSR    ECHO 5/30/2024 ([facility])  LVEF 55-60%  Grad eI DD  Ascending aorta 4,4 cm  MAC  Mild aortic stenosis  IVC normal    Chest CT 5/24/2024 personal review:  CAC in LAD, LCX, RCA         Assessment/Recommendations   Assessment:    Mr. Jonathan Blackwell is a 83 year old male with a significant past history of HTN, HLD, CAC seen on CT scan,  who presents for evaluation of lightheadedness, blurry vision, and dyspnea on exertion.      Lightheadedness   - Likely related to orthostasis and hypotension   - Will stop hydrochlorothiazide and lasix   - Aim for SBP around 130    JAMES   - Cannot rule out anginal equivalent, especially in light of significant CAC on CT scan   - NM stress test    LE edema   - Most likely venous insufficiency   - TTE with grade I diastolic dysfunction not  consistent with CHF   - Will stop lasix.  Conservative measures for swelling including leg elevation and if needed compression stockings.    RTC 4 months           Ignacio Mclaughlin DO FAC  Non-invasive Cardiologist  [facility]         Thank you for allowing me to participate in the care of your patient.      Sincerely,     Ignacio Mclaughlin DO     [facility]    cc:   Cedric Gloria MD  [facility]  [facility] [place] [address]

## 2024-06-07 NOTE — PROGRESS NOTES
[facility] HEART CARE   [address]  [place] [address]   [email or web address]   OFFICE: [phone]     CARDIOLOGY CLINIC NOTE     Thank you, Gulshan Beckwith, for asking the [facility] Heart Care team to see Mr. Jonathan Blackwell to evaluate New Patient        History of Present Illness   Mr. Jonathan Blackwell is a 83 year old male with a significant past history of HTN, HLD, CAC seen on CT scan,  who presents for evaluation of lightheadedness, blurry vision, and dyspnea on exertion.  The patient notes for the last month he has been experiencing lightheadedness on position changes.  He notes this symptom lasts for about 10 seconds.  He has not fallen or passed out.  He has also been noticing some leg swelling and so was started on lasix by his PCP.  Echocardiogram was recently done.  The patient also notes he gets fatigued and winded with exertion.  This has been ongoing for a year.  He denies any other exertional symptoms.  No chest pain.           Medications  Allergies   Current Outpatient Medications   Medication Sig Dispense Refill    cholecalciferol, vitamin D3, 1,000 unit tablet [CHOLECALCIFEROL, VITAMIN D3, 1,000 UNIT TABLET] Take 2,000 Units by mouth daily.      losartan-hydrochlorothiazide (HYZAAR) 50-12.5 MG tablet [LOSARTAN-HYDROCHLOROTHIAZIDE (HYZAAR) 50-12.5 MG PER TABLET] TAKE ONE TABLET BY MOUTH ONE TIME DAILY 90 tablet 0    lovastatin (MEVACOR) 40 MG tablet TAKE HALF TABLET BY MOUTH AT BEDTIME 45 tablet 1    meloxicam (MOBIC) 7.5 MG tablet [MELOXICAM (MOBIC) 7.5 MG TABLET]       MULTIVITAMIN ORAL [MULTIVITAMIN ORAL] Take 1 tablet by mouth 3 (three) times a week.       tamsulosin (FLOMAX) 0.4 MG capsule [TAMSULOSIN (FLOMAX) 0.4 MG CAP] TAKE ONE CAPSULE BY MOUTH AT BEDTIME 90 capsule 0    vit C/vit E ac/lut/copper/zinc (PRESERVISION LUTEIN ORAL) [VIT C/VIT E AC/LUT/COPPER/ZINC (PRESERVISION LUTEIN ORAL)] Take by mouth.        Allergies   Allergen  "Reactions    Lisinopril Other (See Comments)     Swollen lips, Other reaction(s): HIVES, LIP SWELLING, HIVES, LIP SWELLING, Swollen lips, Other reaction(s): HIVES, LIP SWELLING, HIVES, LIP SWELLING        Physical Examination Review of Systems   Vitals: There were no vitals taken for this visit.  BMI= There is no height or weight on file to calculate BMI.  Wt Readings from Last 3 Encounters:   10/04/22 83.9 kg (185 lb)   21 88.3 kg (194 lb 9.6 oz)   20 87.2 kg (192 lb 4.8 oz)       General: pleasant male. No acute distress.   Neck: No JVD  Lungs: clear to auscultation  COR:  regular rate and rhythm, No murmurs, rubs, or gallops  Extrem: trace edema        Please refer above for cardiac ROS details.       Past History     Family History:   Family History   Problem Relation Age of Onset    Other - See Comments Mother         Cause of death unclear to him.    Dementia Father         \"Might have had a little bit of Alzheimer's coming on.\"    Other - See Comments Father         Cause of death unclear to him.    Aneurysm Brother         SAH from brain aneurysm.    Diabetes Maternal Grandmother     No Known Problems Daughter     No Known Problems Son         Social History:   Social History     Socioeconomic History    Marital status:      Spouse name: Not on file    Number of children: Not on file    Years of education: Not on file    Highest education level: Not on file   Occupational History    Not on file   Tobacco Use    Smoking status: Former     Current packs/day: 0.00     Types: Cigarettes     Quit date: 1995     Years since quittin.9    Smokeless tobacco: Never    Tobacco comments:     Quit date was in .  Precise date is nominal.   Substance and Sexual Activity    Alcohol use: Yes     Alcohol/week: 1.0 standard drink of alcohol     Comment: Alcoholic Drinks/day: Beer when golfing.  A little wine.  Does not drink regularly.    Drug use: No    Sexual activity: Not on file   Other " Topics Concern    Not on file   Social History Narrative    Not on file     Social Determinants of Health     Financial Resource Strain: Not on file   Food Insecurity: Not on file   Transportation Needs: Not on file   Physical Activity: Not on file   Stress: Not on file   Social Connections: Not on file   Interpersonal Safety: Not on file   Housing Stability: Not on file            Lab Results    Chemistry/lipid CBC Cardiac Enzymes/BNP/TSH/INR   Lab Results   Component Value Date    CHOL 137 04/08/2024    HDL 54 04/08/2024    TRIG 86 04/08/2024    BUN 13.3 05/24/2024     05/24/2024    CO2 28 05/24/2024    Lab Results   Component Value Date    WBC 7.0 05/24/2024    HGB 13.2 (L) 05/24/2024    HCT 40.3 05/24/2024    MCV 93 05/24/2024     05/24/2024    Lab Results   Component Value Date    TSH 2.55 03/05/2020          Cardiac Problems and Cardiac Diagnostics     Most Recent Cardiac testing:  ECG Personal interpretation  5/24/2024  NSR    ECHO 5/30/2024 ([facility])  LVEF 55-60%  Grad eI DD  Ascending aorta 4,4 cm  MAC  Mild aortic stenosis  IVC normal    Chest CT 5/24/2024 personal review:  CAC in LAD, LCX, RCA         Assessment/Recommendations   Assessment:    Mr. Jonathan Blackwell is a 83 year old male with a significant past history of HTN, HLD, CAC seen on CT scan,  who presents for evaluation of lightheadedness, blurry vision, and dyspnea on exertion.      Lightheadedness   - Likely related to orthostasis and hypotension   - Will stop hydrochlorothiazide and lasix   - Aim for SBP around 130    JAMES   - Cannot rule out anginal equivalent, especially in light of significant CAC on CT scan   - NM stress test    LE edema   - Most likely venous insufficiency   - TTE with grade I diastolic dysfunction not consistent with CHF   - Will stop lasix.  Conservative measures for swelling including leg elevation and if needed compression stockings.    RTC 4 months           Ignacio Mclaughlin DO FACC  Non-invasive  Cardiologist  [facility]

## 2024-06-11 ENCOUNTER — INFUSION THERAPY VISIT (OUTPATIENT)
Dept: INFUSION THERAPY | Facility: HOSPITAL | Age: 84
End: 2024-06-11
Attending: FAMILY MEDICINE
Payer: COMMERCIAL

## 2024-06-11 VITALS
SYSTOLIC BLOOD PRESSURE: 131 MMHG | HEART RATE: 68 BPM | TEMPERATURE: 98 F | DIASTOLIC BLOOD PRESSURE: 82 MMHG | OXYGEN SATURATION: 96 % | RESPIRATION RATE: 16 BRPM

## 2024-06-11 DIAGNOSIS — D50.8 IRON DEFICIENCY ANEMIA SECONDARY TO INADEQUATE DIETARY IRON INTAKE: Primary | ICD-10-CM

## 2024-06-11 PROCEDURE — 96365 THER/PROPH/DIAG IV INF INIT: CPT

## 2024-06-11 PROCEDURE — 258N000003 HC RX IP 258 OP 636: Mod: JZ | Performed by: FAMILY MEDICINE

## 2024-06-11 PROCEDURE — 250N000011 HC RX IP 250 OP 636: Performed by: FAMILY MEDICINE

## 2024-06-11 PROCEDURE — 96366 THER/PROPH/DIAG IV INF ADDON: CPT

## 2024-06-11 RX ORDER — HEPARIN SODIUM (PORCINE) LOCK FLUSH IV SOLN 100 UNIT/ML 100 UNIT/ML
5 SOLUTION INTRAVENOUS
Status: CANCELLED | OUTPATIENT
Start: 2024-06-12

## 2024-06-11 RX ORDER — HEPARIN SODIUM,PORCINE 10 UNIT/ML
5-20 VIAL (ML) INTRAVENOUS DAILY PRN
Status: CANCELLED | OUTPATIENT
Start: 2024-06-12

## 2024-06-11 RX ORDER — DIPHENHYDRAMINE HYDROCHLORIDE 50 MG/ML
50 INJECTION INTRAMUSCULAR; INTRAVENOUS
Status: CANCELLED
Start: 2024-06-12

## 2024-06-11 RX ORDER — ALBUTEROL SULFATE 0.83 MG/ML
2.5 SOLUTION RESPIRATORY (INHALATION)
Status: CANCELLED | OUTPATIENT
Start: 2024-06-12

## 2024-06-11 RX ORDER — ALBUTEROL SULFATE 90 UG/1
1-2 AEROSOL, METERED RESPIRATORY (INHALATION)
Status: CANCELLED
Start: 2024-06-12

## 2024-06-11 RX ORDER — MEPERIDINE HYDROCHLORIDE 25 MG/ML
25 INJECTION INTRAMUSCULAR; INTRAVENOUS; SUBCUTANEOUS EVERY 30 MIN PRN
Status: CANCELLED | OUTPATIENT
Start: 2024-06-12

## 2024-06-11 RX ORDER — METHYLPREDNISOLONE SODIUM SUCCINATE 125 MG/2ML
125 INJECTION, POWDER, LYOPHILIZED, FOR SOLUTION INTRAMUSCULAR; INTRAVENOUS
Status: CANCELLED
Start: 2024-06-12

## 2024-06-11 RX ORDER — EPINEPHRINE 1 MG/ML
0.3 INJECTION, SOLUTION INTRAMUSCULAR; SUBCUTANEOUS EVERY 5 MIN PRN
Status: CANCELLED | OUTPATIENT
Start: 2024-06-12

## 2024-06-11 RX ADMIN — SODIUM CHLORIDE 250 ML: 9 INJECTION, SOLUTION INTRAVENOUS at 13:09

## 2024-06-11 RX ADMIN — IRON SUCROSE 300 MG: 20 INJECTION, SOLUTION INTRAVENOUS at 13:11

## 2024-06-11 NOTE — PROGRESS NOTES
Infusion Nursing Note:  Jonathan Blackwell presents today for #2/3 venofer 300 mg infusion.    Patient seen by provider today: No   present during visit today: Not Applicable.    Note: Jonathan arrived ambulatory with assistance of a cane. He denies any side effects from his first dose of venofer. Iron infused over 90 minutes followed by NS rinse. Vital signs stable. He denies side effects from today's infusion. Jonathan is scheduled to return for his final dose of venofer on 6/13/24.      Intravenous Access:  Peripheral IV placed.    Treatment Conditions:  Not Applicable.      Post Infusion Assessment:  Patient tolerated infusion without incident.  Site patent and intact, free from redness, edema or discomfort.  Access discontinued per protocol.       Discharge Plan:   Patient discharged in stable condition accompanied by: daughter.  Departure Mode: Ambulatory.      Kaela Pope RN

## 2024-06-12 ENCOUNTER — HOSPITAL ENCOUNTER (OUTPATIENT)
Dept: NUCLEAR MEDICINE | Facility: HOSPITAL | Age: 84
Discharge: HOME | End: 2024-06-12
Attending: STUDENT IN AN ORGANIZED HEALTH CARE EDUCATION/TRAINING PROGRAM
Payer: COMMERCIAL

## 2024-06-12 ENCOUNTER — HOSPITAL ENCOUNTER (OUTPATIENT)
Dept: CARDIOLOGY | Facility: HOSPITAL | Age: 84
Discharge: HOME | End: 2024-06-12
Attending: STUDENT IN AN ORGANIZED HEALTH CARE EDUCATION/TRAINING PROGRAM
Payer: COMMERCIAL

## 2024-06-12 DIAGNOSIS — R06.09 EXERTIONAL DYSPNEA: ICD-10-CM

## 2024-06-12 LAB
CV STRESS CURRENT BP HE: NORMAL
CV STRESS CURRENT HR HE: 59
CV STRESS CURRENT HR HE: 63
CV STRESS CURRENT HR HE: 65
CV STRESS CURRENT HR HE: 81
CV STRESS CURRENT HR HE: 81
CV STRESS CURRENT HR HE: 82
CV STRESS CURRENT HR HE: 83
CV STRESS CURRENT HR HE: 83
CV STRESS CURRENT HR HE: 84
CV STRESS CURRENT HR HE: 85
CV STRESS CURRENT HR HE: 86
CV STRESS CURRENT HR HE: 91
CV STRESS CURRENT HR HE: 91
CV STRESS CURRENT HR HE: 94
CV STRESS CURRENT HR HE: 96
CV STRESS CURRENT HR HE: 97
CV STRESS DEVIATION TIME HE: NORMAL
CV STRESS ECHO PERCENT HR HE: NORMAL
CV STRESS EXERCISE STAGE HE: NORMAL
CV STRESS FINAL RESTING BP HE: NORMAL
CV STRESS FINAL RESTING HR HE: 81
CV STRESS MAX HR HE: 99
CV STRESS MAX TREADMILL GRADE HE: 0
CV STRESS MAX TREADMILL SPEED HE: 0
CV STRESS PEAK DIA BP HE: NORMAL
CV STRESS PEAK SYS BP HE: NORMAL
CV STRESS PHASE HE: NORMAL
CV STRESS PROTOCOL HE: NORMAL
CV STRESS RESTING PT POSITION HE: NORMAL
CV STRESS ST DEVIATION AMOUNT HE: NORMAL
CV STRESS ST DEVIATION ELEVATION HE: NORMAL
CV STRESS ST EVELATION AMOUNT HE: NORMAL
CV STRESS TEST TYPE HE: NORMAL
CV STRESS TOTAL STAGE TIME MIN 1 HE: NORMAL
RATE PRESSURE PRODUCT: NORMAL
STRESS ECHO BASELINE DIASTOLIC HE: 77
STRESS ECHO BASELINE HR: 62
STRESS ECHO BASELINE SYSTOLIC BP: 150
STRESS ECHO CALCULATED PERCENT HR: 73 %
STRESS ECHO LAST STRESS DIASTOLIC BP: 60
STRESS ECHO LAST STRESS HR: 91
STRESS ECHO LAST STRESS SYSTOLIC BP: 127
STRESS ECHO TARGET HR: 135

## 2024-06-12 PROCEDURE — 343N000001 HC RX 343: Performed by: STUDENT IN AN ORGANIZED HEALTH CARE EDUCATION/TRAINING PROGRAM

## 2024-06-12 PROCEDURE — 93016 CV STRESS TEST SUPVJ ONLY: CPT | Performed by: INTERNAL MEDICINE

## 2024-06-12 PROCEDURE — 250N000011 HC RX IP 250 OP 636: Mod: JZ | Performed by: STUDENT IN AN ORGANIZED HEALTH CARE EDUCATION/TRAINING PROGRAM

## 2024-06-12 PROCEDURE — 93017 CV STRESS TEST TRACING ONLY: CPT

## 2024-06-12 PROCEDURE — 93018 CV STRESS TEST I&R ONLY: CPT | Performed by: GENERAL ACUTE CARE HOSPITAL

## 2024-06-12 PROCEDURE — A9500 TC99M SESTAMIBI: HCPCS | Performed by: STUDENT IN AN ORGANIZED HEALTH CARE EDUCATION/TRAINING PROGRAM

## 2024-06-12 PROCEDURE — 78452 HT MUSCLE IMAGE SPECT MULT: CPT | Mod: 26 | Performed by: GENERAL ACUTE CARE HOSPITAL

## 2024-06-12 PROCEDURE — 78452 HT MUSCLE IMAGE SPECT MULT: CPT

## 2024-06-12 RX ORDER — ALBUTEROL SULFATE 0.83 MG/ML
2.5 SOLUTION RESPIRATORY (INHALATION)
Status: DISCONTINUED | OUTPATIENT
Start: 2024-06-12 | End: 2024-06-12 | Stop reason: HOSPADM

## 2024-06-12 RX ORDER — AMINOPHYLLINE 25 MG/ML
50 INJECTION, SOLUTION INTRAVENOUS
Status: DISCONTINUED | OUTPATIENT
Start: 2024-06-12 | End: 2024-06-12 | Stop reason: HOSPADM

## 2024-06-12 RX ORDER — CAFFEINE 200 MG
200 TABLET ORAL
Status: DISCONTINUED | OUTPATIENT
Start: 2024-06-12 | End: 2024-06-12 | Stop reason: HOSPADM

## 2024-06-12 RX ORDER — CAFFEINE CITRATE 20 MG/ML
60 SOLUTION INTRAVENOUS
Status: DISCONTINUED | OUTPATIENT
Start: 2024-06-12 | End: 2024-06-12 | Stop reason: HOSPADM

## 2024-06-12 RX ORDER — REGADENOSON 0.08 MG/ML
0.4 INJECTION, SOLUTION INTRAVENOUS ONCE
Status: COMPLETED | OUTPATIENT
Start: 2024-06-12 | End: 2024-06-12

## 2024-06-12 RX ADMIN — REGADENOSON 0.4 MG: 0.08 INJECTION, SOLUTION INTRAVENOUS at 09:25

## 2024-06-12 RX ADMIN — Medication 8.3 MILLICURIE: at 07:56

## 2024-06-12 RX ADMIN — Medication 31 MILLICURIE: at 09:25

## 2024-06-13 ENCOUNTER — INFUSION THERAPY VISIT (OUTPATIENT)
Dept: INFUSION THERAPY | Facility: HOSPITAL | Age: 84
End: 2024-06-13
Attending: FAMILY MEDICINE
Payer: COMMERCIAL

## 2024-06-13 VITALS
TEMPERATURE: 97.9 F | OXYGEN SATURATION: 98 % | DIASTOLIC BLOOD PRESSURE: 90 MMHG | HEART RATE: 68 BPM | SYSTOLIC BLOOD PRESSURE: 126 MMHG | RESPIRATION RATE: 16 BRPM

## 2024-06-13 DIAGNOSIS — D50.8 IRON DEFICIENCY ANEMIA SECONDARY TO INADEQUATE DIETARY IRON INTAKE: Primary | ICD-10-CM

## 2024-06-13 PROCEDURE — 96366 THER/PROPH/DIAG IV INF ADDON: CPT

## 2024-06-13 PROCEDURE — 96365 THER/PROPH/DIAG IV INF INIT: CPT

## 2024-06-13 PROCEDURE — 250N000011 HC RX IP 250 OP 636: Performed by: FAMILY MEDICINE

## 2024-06-13 PROCEDURE — 258N000003 HC RX IP 258 OP 636: Mod: JZ | Performed by: FAMILY MEDICINE

## 2024-06-13 RX ORDER — METHYLPREDNISOLONE SODIUM SUCCINATE 125 MG/2ML
125 INJECTION, POWDER, LYOPHILIZED, FOR SOLUTION INTRAMUSCULAR; INTRAVENOUS
Start: 2024-06-13

## 2024-06-13 RX ORDER — MEPERIDINE HYDROCHLORIDE 25 MG/ML
25 INJECTION INTRAMUSCULAR; INTRAVENOUS; SUBCUTANEOUS EVERY 30 MIN PRN
OUTPATIENT
Start: 2024-06-13

## 2024-06-13 RX ORDER — DIPHENHYDRAMINE HYDROCHLORIDE 50 MG/ML
50 INJECTION INTRAMUSCULAR; INTRAVENOUS
Start: 2024-06-13

## 2024-06-13 RX ORDER — ALBUTEROL SULFATE 90 UG/1
1-2 AEROSOL, METERED RESPIRATORY (INHALATION)
Start: 2024-06-13

## 2024-06-13 RX ORDER — ALBUTEROL SULFATE 0.83 MG/ML
2.5 SOLUTION RESPIRATORY (INHALATION)
OUTPATIENT
Start: 2024-06-13

## 2024-06-13 RX ORDER — HEPARIN SODIUM (PORCINE) LOCK FLUSH IV SOLN 100 UNIT/ML 100 UNIT/ML
5 SOLUTION INTRAVENOUS
OUTPATIENT
Start: 2024-06-13

## 2024-06-13 RX ORDER — EPINEPHRINE 1 MG/ML
0.3 INJECTION, SOLUTION INTRAMUSCULAR; SUBCUTANEOUS EVERY 5 MIN PRN
OUTPATIENT
Start: 2024-06-13

## 2024-06-13 RX ORDER — HEPARIN SODIUM,PORCINE 10 UNIT/ML
5-20 VIAL (ML) INTRAVENOUS DAILY PRN
OUTPATIENT
Start: 2024-06-13

## 2024-06-13 RX ADMIN — IRON SUCROSE 300 MG: 20 INJECTION, SOLUTION INTRAVENOUS at 08:56

## 2024-06-13 RX ADMIN — SODIUM CHLORIDE 250 ML: 9 INJECTION, SOLUTION INTRAVENOUS at 08:56

## 2024-06-13 NOTE — PROGRESS NOTES
Infusion Nursing Note:  Jonathan Blackwell presents today for venofer dose 3/3.    Patient seen by provider today: No   present during visit today: Not Applicable.    Note: Jonathan arrived ambulatory and in stable condition. PIV placed in left AC and good blood return noted. Iron administered per orders. PIV removed upon completion and site covered with gauze and secured with coban.      Intravenous Access:  Peripheral IV placed.    Treatment Conditions:  Not Applicable.      Post Infusion Assessment:  Patient tolerated infusion without incident.  Blood return noted pre and post infusion.  Site patent and intact, free from redness, edema or discomfort.  No evidence of extravasations.  Access discontinued per protocol.       Discharge Plan:   Patient and/or family verbalized understanding of discharge instructions and all questions answered.  Patient discharged in stable condition accompanied by: self.  Departure Mode: Ambulatory.      Karen Leigh RN

## 2024-10-15 ENCOUNTER — LAB REQUISITION (OUTPATIENT)
Dept: LAB | Facility: CLINIC | Age: 84
End: 2024-10-15

## 2024-10-15 ENCOUNTER — TRANSFERRED RECORDS (OUTPATIENT)
Dept: HEALTH INFORMATION MANAGEMENT | Facility: CLINIC | Age: 84
End: 2024-10-15
Payer: COMMERCIAL

## 2024-10-15 DIAGNOSIS — R33.9 RETENTION OF URINE, UNSPECIFIED: ICD-10-CM

## 2024-10-15 PROCEDURE — 87086 URINE CULTURE/COLONY COUNT: CPT | Performed by: FAMILY MEDICINE

## 2024-10-16 LAB — BACTERIA UR CULT: NO GROWTH

## 2024-10-21 ENCOUNTER — HOSPITAL ENCOUNTER (EMERGENCY)
Facility: HOSPITAL | Age: 84
Discharge: HOME | End: 2024-10-21
Attending: EMERGENCY MEDICINE | Admitting: EMERGENCY MEDICINE
Payer: COMMERCIAL

## 2024-10-21 VITALS
OXYGEN SATURATION: 97 % | RESPIRATION RATE: 18 BRPM | TEMPERATURE: 98.2 F | DIASTOLIC BLOOD PRESSURE: 77 MMHG | HEIGHT: 63 IN | SYSTOLIC BLOOD PRESSURE: 154 MMHG | HEART RATE: 76 BPM | WEIGHT: 185 LBS | BODY MASS INDEX: 32.78 KG/M2

## 2024-10-21 DIAGNOSIS — T83.511A URINARY TRACT INFECTION ASSOCIATED WITH INDWELLING URETHRAL CATHETER, INITIAL ENCOUNTER (H): ICD-10-CM

## 2024-10-21 DIAGNOSIS — N39.0 URINARY TRACT INFECTION ASSOCIATED WITH INDWELLING URETHRAL CATHETER, INITIAL ENCOUNTER (H): ICD-10-CM

## 2024-10-21 DIAGNOSIS — N48.1 BALANITIS: ICD-10-CM

## 2024-10-21 LAB
ALBUMIN UR-MCNC: 600 MG/DL
ANION GAP SERPL CALCULATED.3IONS-SCNC: 12 MMOL/L (ref 7–15)
APPEARANCE UR: ABNORMAL
BACTERIA #/AREA URNS HPF: ABNORMAL /HPF
BASOPHILS # BLD MANUAL: 0 10E3/UL (ref 0–0.2)
BASOPHILS NFR BLD MANUAL: 0 %
BILIRUB UR QL STRIP: NEGATIVE
BUN SERPL-MCNC: 15.4 MG/DL (ref 8–23)
CALCIUM SERPL-MCNC: 9.2 MG/DL (ref 8.8–10.4)
CHLORIDE SERPL-SCNC: 97 MMOL/L (ref 98–107)
COLOR UR AUTO: YELLOW
CREAT SERPL-MCNC: 0.94 MG/DL (ref 0.67–1.17)
D DIMER PPP FEU-MCNC: 0.89 UG/ML FEU (ref 0–0.5)
EGFRCR SERPLBLD CKD-EPI 2021: 79 ML/MIN/1.73M2
EOSINOPHIL # BLD MANUAL: 0 10E3/UL (ref 0–0.7)
EOSINOPHIL NFR BLD MANUAL: 0 %
ERYTHROCYTE [DISTWIDTH] IN BLOOD BY AUTOMATED COUNT: 14.4 % (ref 10–15)
GLUCOSE SERPL-MCNC: 117 MG/DL (ref 70–99)
GLUCOSE UR STRIP-MCNC: NEGATIVE MG/DL
HCO3 SERPL-SCNC: 24 MMOL/L (ref 22–29)
HCT VFR BLD AUTO: 37.5 % (ref 40–53)
HGB BLD-MCNC: 12.2 G/DL (ref 13.3–17.7)
HGB UR QL STRIP: ABNORMAL
KETONES UR STRIP-MCNC: ABNORMAL MG/DL
LEUKOCYTE ESTERASE UR QL STRIP: ABNORMAL
LYMPHOCYTES # BLD MANUAL: 1.4 10E3/UL (ref 0.8–5.3)
LYMPHOCYTES NFR BLD MANUAL: 7 %
MCH RBC QN AUTO: 30.5 PG (ref 26.5–33)
MCHC RBC AUTO-ENTMCNC: 32.5 G/DL (ref 31.5–36.5)
MCV RBC AUTO: 94 FL (ref 78–100)
MONOCYTES # BLD MANUAL: 1.7 10E3/UL (ref 0–1.3)
MONOCYTES NFR BLD MANUAL: 8 %
MUCOUS THREADS #/AREA URNS LPF: PRESENT /LPF
NEUTROPHILS # BLD MANUAL: 17.6 10E3/UL (ref 1.6–8.3)
NEUTROPHILS NFR BLD MANUAL: 85 %
NITRATE UR QL: POSITIVE
PH UR STRIP: 6 [PH] (ref 5–7)
PLAT MORPH BLD: ABNORMAL
PLATELET # BLD AUTO: 316 10E3/UL (ref 150–450)
POTASSIUM SERPL-SCNC: 4.1 MMOL/L (ref 3.4–5.3)
RBC # BLD AUTO: 4 10E6/UL (ref 4.4–5.9)
RBC MORPH BLD: ABNORMAL
RBC URINE: >182 /HPF
SODIUM SERPL-SCNC: 133 MMOL/L (ref 135–145)
SP GR UR STRIP: 1.03 (ref 1–1.03)
UROBILINOGEN UR STRIP-MCNC: 2 MG/DL
WBC # BLD AUTO: 20.7 10E3/UL (ref 4–11)
WBC URINE: >182 /HPF

## 2024-10-21 PROCEDURE — 82310 ASSAY OF CALCIUM: CPT | Performed by: FAMILY MEDICINE

## 2024-10-21 PROCEDURE — 81001 URINALYSIS AUTO W/SCOPE: CPT | Performed by: FAMILY MEDICINE

## 2024-10-21 PROCEDURE — 85007 BL SMEAR W/DIFF WBC COUNT: CPT | Performed by: EMERGENCY MEDICINE

## 2024-10-21 PROCEDURE — 36415 COLL VENOUS BLD VENIPUNCTURE: CPT | Performed by: FAMILY MEDICINE

## 2024-10-21 PROCEDURE — 80048 BASIC METABOLIC PNL TOTAL CA: CPT | Performed by: FAMILY MEDICINE

## 2024-10-21 PROCEDURE — 87186 SC STD MICRODIL/AGAR DIL: CPT | Performed by: FAMILY MEDICINE

## 2024-10-21 PROCEDURE — 85027 COMPLETE CBC AUTOMATED: CPT | Performed by: EMERGENCY MEDICINE

## 2024-10-21 PROCEDURE — 36415 COLL VENOUS BLD VENIPUNCTURE: CPT | Performed by: EMERGENCY MEDICINE

## 2024-10-21 PROCEDURE — 250N000011 HC RX IP 250 OP 636: Performed by: EMERGENCY MEDICINE

## 2024-10-21 PROCEDURE — 85379 FIBRIN DEGRADATION QUANT: CPT | Performed by: FAMILY MEDICINE

## 2024-10-21 PROCEDURE — 96365 THER/PROPH/DIAG IV INF INIT: CPT

## 2024-10-21 PROCEDURE — 51702 INSERT TEMP BLADDER CATH: CPT

## 2024-10-21 PROCEDURE — 250N000013 HC RX MED GY IP 250 OP 250 PS 637: Performed by: EMERGENCY MEDICINE

## 2024-10-21 PROCEDURE — 99284 EMERGENCY DEPT VISIT MOD MDM: CPT | Mod: 25

## 2024-10-21 RX ORDER — CLOTRIMAZOLE 1 %
CREAM (GRAM) TOPICAL ONCE
Status: COMPLETED | OUTPATIENT
Start: 2024-10-21 | End: 2024-10-21

## 2024-10-21 RX ORDER — BENZOCAINE/MENTHOL 6 MG-10 MG
LOZENGE MUCOUS MEMBRANE ONCE
Status: COMPLETED | OUTPATIENT
Start: 2024-10-21 | End: 2024-10-21

## 2024-10-21 RX ORDER — HYDROCORTISONE 25 MG/G
OINTMENT TOPICAL 2 TIMES DAILY
Qty: 30 G | Refills: 1 | Status: SHIPPED | OUTPATIENT
Start: 2024-10-21 | End: 2024-11-04

## 2024-10-21 RX ORDER — CLOTRIMAZOLE 1 %
CREAM (GRAM) TOPICAL 2 TIMES DAILY
Qty: 14 G | Refills: 0 | Status: SHIPPED | OUTPATIENT
Start: 2024-10-21

## 2024-10-21 RX ORDER — CIPROFLOXACIN 500 MG/1
500 TABLET, FILM COATED ORAL 2 TIMES DAILY
Qty: 10 TABLET | Refills: 0 | Status: SHIPPED | OUTPATIENT
Start: 2024-10-21

## 2024-10-21 RX ORDER — CEFTRIAXONE 1 G/1
1 INJECTION, POWDER, FOR SOLUTION INTRAMUSCULAR; INTRAVENOUS ONCE
Status: COMPLETED | OUTPATIENT
Start: 2024-10-21 | End: 2024-10-21

## 2024-10-21 RX ADMIN — CLOTRIMAZOLE: 1 CREAM TOPICAL at 20:30

## 2024-10-21 RX ADMIN — CEFTRIAXONE SODIUM 1 G: 1 INJECTION, POWDER, FOR SOLUTION INTRAMUSCULAR; INTRAVENOUS at 20:06

## 2024-10-21 RX ADMIN — HYDROCORTISONE: 1 CREAM TOPICAL at 20:30

## 2024-10-21 ASSESSMENT — ACTIVITIES OF DAILY LIVING (ADL)
ADLS_ACUITY_SCORE: 35

## 2024-10-21 NOTE — ED PROVIDER NOTES
EMERGENCY DEPARTMENT ENCOUNTER      NAME: Jonathan Blackwell  AGE: 84 year old male  YOB: 1940  MRN: [id number]  EVALUATION DATE & TIME: 10/21/2024  5:36 PM    PCP: Gulshan Finnegan    ED PROVIDER: Elisa Rodriguez M.D.      Chief Complaint   Patient presents with    Catheter Problem    Fever       FINAL IMPRESSION:  1. Balanitis    2. Urinary tract infection associated with indwelling urethral catheter, initial encounter (H)        ED COURSE & MEDICAL DECISION MAKIN. Burning at catheter site.  UTI present after exchanging metz and UA sent.  I ordered blood work to check kidney function. Kidney function stable.  Noted was elevated WBC. He does not appear toxic or septic despite WBC elevation. He was offered admission but after shared decision making was comfortable going home with strict return precautions.  I did not order a d dimer, this was ordered by another physician while inputting screening labs. I do not have suspicion for PE as patient does not have symptoms and is here for dysuria. I noted it was slightly elevated but this is likely 2/2 UTI.  Cipro antibiotic ordered.  Patient also noted to have balanitis on my exam. I ordered topical meds for this and he has follow up appt with urology on Wednesday.     6:04 PM I met with the patient to gather history and to perform my initial exam. I discussed the plan for care while in the Emergency Department.  8:02 PM We discussed the plan for discharge and the patient is agreeable. Reviewed supportive cares, symptomatic treatment, outpatient follow up, and reasons to return to the Emergency Department. Patient to be discharged by ED RN.      Pertinent Labs & Imaging studies reviewed. (See chart for details).    Medical Decision Making  Obtained supplemental history:Supplemental history obtained?: Documented in chart and Family Member/Significant Other  Reviewed external records: External records reviewed?: Documented in chart Cardiology note 24  Dr. Mclaughlin, [facility] clinic note 5/29/24  Care impacted by chronic illness:Documented in Chart HTN, HLD, hx dizziness and chronic sob.  Did you consider but not order tests?: Work up considered but not performed and documented in chart, if applicable  Did you interpret images independently?: Independent interpretation of ECG and images noted in documentation, when applicable.  Consultation discussion with other provider:Did you involve another provider (consultant, , pharmacy, etc.)?: No  Discharge. I prescribed additional prescription strength medication(s) as charted. I considered admission, but discharged patient after significant clinical improvement.    MIPS: Not Applicable        At the conclusion of the encounter I discussed the results of all of the tests and the disposition. The questions were answered. The patient or family acknowledged understanding and was agreeable with the care plan.      CRITICAL CARE:  N/A    HPI    Patient information was obtained from: Patient and his daughter.    Use of : N/A.       Jonathan Blackwell is a 84 year old male who presents to this ED with his family members for evaluation of a catheter problem and fever.     Patient had his current catheter placed 9 days ago (10/12) without any difficulties. Since the procedure, he has been experiencing worsening pain at the insertion site, increased dysuria, and has developed a new fever today, approximately 101 F. The patient reports having taken Tylenol twice today but is not currently on any antibiotics.    Patient also reports a newly developed rash at the catheter insertion site, describing it as irritating, sore, and itchy. He mentions that he attempted to apply lotion to the rash but experienced a burning sensation, leading him to discontinue its use.    Denies any nausea or vomiting. No reported urine leakage from the catheter. Patient is otherwise in his normal state of health with no other concerns.     Per Chart  Review: N/A      REVIEW OF SYSTEMS  All other systems negative unless noted in HPI.    PAST MEDICAL HISTORY:  Past Medical History:   Diagnosis Date    Anemia 10/9/2014    BPH (benign prostatic hyperplasia)     Hyperlipidemia     Hypertension        PAST SURGICAL HISTORY:  Past Surgical History:   Procedure Laterality Date    [facility] APPENDECTOMY      Description: Appendectomy;  Recorded: 11/13/2014;    [facility] LAP,CHOLECYSTECTOMY/EXPLORE      Description: Cholecystectomy Laparoscopic;  Recorded: 11/13/2014;    [facility] RECONSTR TOTAL SHOULDER IMPLANT Left 10/9/2014    Procedure: SHOULDER TOTAL REPLACEMENT ;  Surgeon: Binu Rodriguez MD;  Location: [facility];  Service: Orthopedics    [facility] RECONSTR TOTAL SHOULDER IMPLANT      Description: Shoulder Arthroplasty Total Shoulder Replacement;  Recorded: 11/13/2014;  Comments: LEFT:   9 OCT 2014         CURRENT MEDICATIONS:    No current facility-administered medications for this encounter.     Current Outpatient Medications   Medication Sig Dispense Refill    ciprofloxacin (CIPRO) 500 MG tablet Take 1 tablet (500 mg) by mouth 2 times daily. 10 tablet 0    clotrimazole (LOTRIMIN) 1 % external cream Apply topically 2 times daily. 14 g 0    hydrocortisone 2.5 % ointment Apply topically 2 times daily for 14 days. 30 g 1    cholecalciferol, vitamin D3, 1,000 unit tablet [CHOLECALCIFEROL, VITAMIN D3, 1,000 UNIT TABLET] Take 2,000 Units by mouth daily.      gabapentin (NEURONTIN) 300 MG capsule Take 300 mg by mouth at bedtime      losartan (COZAAR) 50 MG tablet Take 1 tablet (50 mg) by mouth daily 90 tablet 3    lovastatin (MEVACOR) 40 MG tablet TAKE HALF TABLET BY MOUTH AT BEDTIME 45 tablet 1    meloxicam (MOBIC) 7.5 MG tablet Take 7.5 mg by mouth daily as needed      tamsulosin (FLOMAX) 0.4 MG capsule [TAMSULOSIN (FLOMAX) 0.4 MG CAP] TAKE ONE CAPSULE BY MOUTH AT BEDTIME 90 capsule 0    vit C/vit E ac/lut/copper/zinc (PRESERVISION LUTEIN ORAL) Take 1 tablet by mouth 2 times daily        "    ALLERGIES:  Allergies   Allergen Reactions    Lisinopril Other (See Comments)     Swollen lips, Other reaction(s): HIVES, LIP SWELLING, HIVES, LIP SWELLING, Swollen lips, Other reaction(s): HIVES, LIP SWELLING, HIVES, LIP SWELLING       FAMILY HISTORY:  Family History   Problem Relation Age of Onset    Other - See Comments Mother         Cause of death unclear to him.    Dementia Father         \"Might have had a little bit of Alzheimer's coming on.\"    Other - See Comments Father         Cause of death unclear to him.    Aneurysm Brother         SAH from brain aneurysm.    Diabetes Maternal Grandmother     No Known Problems Daughter     No Known Problems Son        SOCIAL HISTORY:  Social History     Socioeconomic History    Marital status:    Tobacco Use    Smoking status: Former     Current packs/day: 0.00     Types: Cigarettes     Quit date: 1995     Years since quittin.3    Smokeless tobacco: Never    Tobacco comments:     Quit date was in .  Precise date is nominal.   Substance and Sexual Activity    Alcohol use: Yes     Alcohol/week: 1.0 standard drink of alcohol     Comment: Alcoholic Drinks/day: Beer when golfing.  A little wine.  Does not drink regularly.    Drug use: No       VITALS:  Patient Vitals for the past 24 hrs:   BP Temp Temp src Pulse Resp SpO2 Height Weight   10/21/24 2030 (!) 154/77 -- -- 76 -- 97 % -- --   10/21/24 2007 (!) 177/81 -- -- 78 -- 95 % -- --   10/21/24 1931 (!) 146/68 -- -- 75 -- 95 % -- --   10/21/24 1900 120/79 -- -- 80 -- 96 % -- --   10/21/24 1845 -- -- -- 84 -- 96 % -- --   10/21/24 1830 128/67 -- -- 85 -- 96 % -- --   10/21/24 1815 -- -- -- 85 -- 98 % -- --   10/21/24 1800 111/59 -- -- 84 -- 96 % -- --   10/21/24 1745 -- -- -- 79 -- 96 % -- --   10/21/24 1546 110/68 98.2  F (36.8  C) Oral 94 18 94 % 1.6 m (5' 3\") 83.9 kg (185 lb)       PHYSICAL EXAM    VITAL SIGNS: BP (!) 154/77   Pulse 76   Temp 98.2  F (36.8  C) (Oral)   Resp 18   Ht 1.6 m (5' " "3\")   Wt 83.9 kg (185 lb)   SpO2 97%   BMI 32.77 kg/m    Physical Exam  Vitals and nursing note reviewed.   Constitutional:       Appearance: He is not toxic-appearing.   Eyes:      General: No scleral icterus.        Right eye: No discharge.         Left eye: No discharge.   Cardiovascular:      Rate and Rhythm: Normal rate and regular rhythm.   Pulmonary:      Effort: Pulmonary effort is normal. No respiratory distress.      Breath sounds: Normal breath sounds.   Abdominal:      General: There is no distension.      Palpations: Abdomen is soft.      Tenderness: There is no abdominal tenderness.   Genitourinary:     Comments: Blank cathter in place draining cloudy yellow urine.  Erythematous skin with flaking on glans of penis. Circumcised male.   Musculoskeletal:         General: No swelling or deformity.      Cervical back: Neck supple. No rigidity.   Skin:     General: Skin is warm and dry.      Capillary Refill: Capillary refill takes less than 2 seconds.      Findings: No bruising or erythema.   Neurological:      General: No focal deficit present.      Mental Status: He is alert and oriented to person, place, and time. Mental status is at baseline.      Comments: No slurred speech, following commands spontaneously. No facial droop.   Psychiatric:         Mood and Affect: Mood normal.         Behavior: Behavior normal.         LABS  Labs Ordered and Resulted from Time of ED Arrival to Time of ED Departure   D DIMER QUANTITATIVE - Abnormal       Result Value    D-Dimer Quantitative 0.89 (*)    BASIC METABOLIC PANEL - Abnormal    Sodium 133 (*)     Potassium 4.1      Chloride 97 (*)     Carbon Dioxide (CO2) 24      Anion Gap 12      Urea Nitrogen 15.4      Creatinine 0.94      GFR Estimate 79      Calcium 9.2      Glucose 117 (*)    ROUTINE UA WITH MICROSCOPIC REFLEX TO CULTURE - Abnormal    Color Urine Yellow      Appearance Urine Turbid (*)     Glucose Urine Negative      Bilirubin Urine Negative      " Ketones Urine Trace (*)     Specific Gravity Urine 1.034 (*)     Blood Urine 1.0 mg/dL (*)     pH Urine 6.0      Protein Albumin Urine 600 (*)     Urobilinogen Urine 2.0 (*)     Nitrite Urine Positive (*)     Leukocyte Esterase Urine 500 Pepper/uL (*)     Bacteria Urine Many (*)     Mucus Urine Present (*)     RBC Urine >182 (*)     WBC Urine >182 (*)    CBC WITH PLATELETS AND DIFFERENTIAL - Abnormal    WBC Count 20.7 (*)     RBC Count 4.00 (*)     Hemoglobin 12.2 (*)     Hematocrit 37.5 (*)     MCV 94      MCH 30.5      MCHC 32.5      RDW 14.4      Platelet Count 316     MANUAL DIFFERENTIAL - Abnormal    % Neutrophils 85      % Lymphocytes 7      % Monocytes 8      % Eosinophils 0      % Basophils 0      Absolute Neutrophils 17.6 (*)     Absolute Lymphocytes 1.4      Absolute Monocytes 1.7 (*)     Absolute Eosinophils 0.0      Absolute Basophils 0.0      RBC Morphology Confirmed RBC Indices      Platelet Assessment        Value: Automated Count Confirmed. Platelet morphology is normal.   URINE CULTURE         RADIOLOGY  No orders to display      NA      EKG:    NA       PROCEDURES:  N/A      MEDICATIONS GIVEN IN THE EMERGENCY:  Medications   clotrimazole (LOTRIMIN) 1 % cream ( Topical $Given 10/21/24 2030)   hydrocortisone (CORTAID) 1 % cream ( Topical $Given 10/21/24 2030)   cefTRIAXone (ROCEPHIN) 1 g vial to attach to  mL bag for ADULTS or NS 50 mL bag for PEDS (0 g Intravenous Stopped 10/21/24 2026)       NEW PRESCRIPTIONS STARTED AT TODAY'S ER VISIT  Discharge Medication List as of 10/21/2024  8:26 PM        START taking these medications    Details   ciprofloxacin (CIPRO) 500 MG tablet Take 1 tablet (500 mg) by mouth 2 times daily., Disp-10 tablet, R-0, E-Prescribe      clotrimazole (LOTRIMIN) 1 % external cream Apply topically 2 times daily.Disp-14 g, [id number]      hydrocortisone 2.5 % ointment Apply topically 2 times daily for 14 days.Disp-30 g, [id number]              Le BARBER am  serving as a scribe to document services personally performed by Elisa Rodriguez MD, based on my observations and the provider's statements to me.  I, Elisa Rodriguez MD, attest that Le Guardado is acting in a scribe capacity, has observed my performance of the services and has documented them in accordance with my direction.     Elisa Rodriguez MD  Emergency Medicine  [facility] EMERGENCY DEPARTMENT  [address] [place] [id number] [phone]  Dept: [phone]             Elisa Rodriguez MD  10/26/24 4598

## 2024-10-21 NOTE — ED TRIAGE NOTES
Patient here due to burning where catheter is inserted. Patient states he has been running a fever as well. Tylenol taken at 1400. Catheter is draining cloudy urine. Patient had catheter inserted about a week ago, will be following up with urology on Wednesday. Patient also endorses generalized weakness since fevers have began.     Triage Assessment (Adult)       Row Name 10/21/24 1548          Triage Assessment    Airway WDL WDL        Respiratory WDL    Respiratory WDL WDL        Skin Circulation/Temperature WDL    Skin Circulation/Temperature WDL WDL        Cardiac WDL    Cardiac WDL WDL        Peripheral/Neurovascular WDL    Peripheral Neurovascular WDL WDL        Cognitive/Neuro/Behavioral WDL    Cognitive/Neuro/Behavioral WDL WDL

## 2024-10-22 NOTE — DISCHARGE INSTRUCTIONS
You have a urinary tract infection and elevated white blood cell count.  You have balanitis, likely from a yeast infection. Discuss with your Urologist during your visit on Wednesday. Clean the head of your penis twice daily with saline solution. Use sop without heavy perfumes or dyes. Use the topical cream/ointment twice daily for treatment until you discuss with Urology.

## 2024-10-23 ENCOUNTER — TELEPHONE (OUTPATIENT)
Dept: NURSING | Facility: CLINIC | Age: 84
End: 2024-10-23
Payer: COMMERCIAL

## 2024-10-23 LAB — BACTERIA UR CULT: ABNORMAL

## 2024-10-23 NOTE — TELEPHONE ENCOUNTER
[facility]    Reason for call: Lab Result Notification     Lab Result (including Rx patient on, if applicable).  If culture, copy of lab report at bottom.  Lab Result: Urine Culture  10/21/24 Prescribed Ciprofloxacin (CIPRO) 500 MG tablet Take 1 tablet (500 mg) by mouth 2 times daily. - Oral (SUSCEPTIBLE)    Creatinine Level (mg/dl)   Creatinine   Date Value Ref Range Status   10/21/2024 0.94 0.67 - 1.17 mg/dL Final    Creatinine clearance (ml/min), if applicable    Serum creatinine: 0.94 mg/dL 10/21/24 1749  Estimated creatinine clearance: 54 mL/min     RN Recommendations/Instructions per [place] ED lab result protocol:   [facility] ED lab result protocol utilized: Urine Culture    Patient's current Symptoms at time of telephone encounter:   Pt states he had a Urology appt today 10/23/24; pt Tamsulosin increased by Urologist.  Pt states Urologist added additional days of antibiotic and pt will have a follow up Urology appt.  Pt states he feels much better than when seen in ED on 10/21/24.      Patient/care giver notified to contact your PCP clinic or return to the Emergency department if your:  Symptoms return.  Symptoms do not improve after 3 days on antibiotic.  Symptoms do not resolve after completing antibiotic.  Symptoms worsen or other concerning symptoms.       Deanna Chatterjee RN

## 2024-11-12 ENCOUNTER — OFFICE VISIT (OUTPATIENT)
Dept: CARDIOLOGY | Facility: CLINIC | Age: 84
End: 2024-11-12
Attending: STUDENT IN AN ORGANIZED HEALTH CARE EDUCATION/TRAINING PROGRAM
Payer: COMMERCIAL

## 2024-11-12 VITALS
RESPIRATION RATE: 16 BRPM | WEIGHT: 184 LBS | SYSTOLIC BLOOD PRESSURE: 120 MMHG | BODY MASS INDEX: 31.41 KG/M2 | HEART RATE: 59 BPM | DIASTOLIC BLOOD PRESSURE: 66 MMHG | HEIGHT: 64 IN

## 2024-11-12 DIAGNOSIS — I35.0 NONRHEUMATIC AORTIC (VALVE) STENOSIS: ICD-10-CM

## 2024-11-12 DIAGNOSIS — I87.2 VENOUS (PERIPHERAL) INSUFFICIENCY: ICD-10-CM

## 2024-11-12 DIAGNOSIS — I25.10 CORONARY ARTERY DISEASE INVOLVING NATIVE CORONARY ARTERY OF NATIVE HEART WITHOUT ANGINA PECTORIS: ICD-10-CM

## 2024-11-12 DIAGNOSIS — I77.810 ASCENDING AORTA DILATION (H): ICD-10-CM

## 2024-11-12 DIAGNOSIS — I10 PRIMARY HYPERTENSION: Primary | ICD-10-CM

## 2024-11-12 PROCEDURE — 99214 OFFICE O/P EST MOD 30 MIN: CPT | Performed by: STUDENT IN AN ORGANIZED HEALTH CARE EDUCATION/TRAINING PROGRAM

## 2024-11-12 PROCEDURE — G2211 COMPLEX E/M VISIT ADD ON: HCPCS | Performed by: STUDENT IN AN ORGANIZED HEALTH CARE EDUCATION/TRAINING PROGRAM

## 2024-11-12 NOTE — LETTER
11/12/2024    Gulshan Finnegan MD  [address]  [place] [address]    RE: Jonathan Lynnnadine       Dear Colleague,     I had the pleasure of seeing Jonathan Blackwell in the [facility].    [facility]   [address]  [place] [address]   [email or web address]   OFFICE: [phone]     CARDIOLOGY CLINIC NOTE     Thank you, Gulshan Beckwith, for asking the [facility] Heart [facility] team to see Mr. Jonathan Blackwell to evaluate Follow Up   .        History of Present Illness   Mr. Jonathan Blackwell is a 84 year old male with a significant past history of HTN, HLD, CAC seen on CT scan, mild aortic stenosis, mild ascending aorta aneurysm, thrombosed saccular aneurysm and chronic dissection of descending abd aorta, who presents for follow up.  The patient notes he has been doing well from a heart perspective.  He notes his dizziness has essentially gone away after medication changes.  He denies dyspnea or chest pain.  He was in the ED recently with UTI.  His leg swelling is minimal.         Medications  Allergies   Current Outpatient Medications   Medication Sig Dispense Refill     cholecalciferol, vitamin D3, 1,000 unit tablet [CHOLECALCIFEROL, VITAMIN D3, 1,000 UNIT TABLET] Take 2,000 Units by mouth daily.       clotrimazole (LOTRIMIN) 1 % external cream Apply topically 2 times daily. 14 g 0     gabapentin (NEURONTIN) 300 MG capsule Take 300 mg by mouth at bedtime       losartan (COZAAR) 50 MG tablet Take 1 tablet (50 mg) by mouth daily 90 tablet 3     lovastatin (MEVACOR) 40 MG tablet TAKE HALF TABLET BY MOUTH AT BEDTIME 45 tablet 1     meloxicam (MOBIC) 7.5 MG tablet Take 7.5 mg by mouth daily as needed       tamsulosin (FLOMAX) 0.4 MG capsule [TAMSULOSIN (FLOMAX) 0.4 MG CAP] TAKE ONE CAPSULE BY MOUTH AT BEDTIME (Patient taking differently: 2 times daily. [TAMSULOSIN (FLOMAX) 0.4 MG CAP] TAKE ONE CAPSULE BY MOUTH AT BEDTIME) 90  "capsule 0     vit C/vit E ac/lut/copper/zinc (PRESERVISION LUTEIN ORAL) Take 1 tablet by mouth 2 times daily       ciprofloxacin (CIPRO) 500 MG tablet Take 1 tablet (500 mg) by mouth 2 times daily. (Patient not taking: Reported on 11/12/2024) 10 tablet 0      Allergies   Allergen Reactions     Lisinopril Other (See Comments)     Swollen lips, Other reaction(s): HIVES, LIP SWELLING, HIVES, LIP SWELLING, Swollen lips, Other reaction(s): HIVES, LIP SWELLING, HIVES, LIP SWELLING        Physical Examination Review of Systems   Vitals: /66 (BP Location: Left arm, Patient Position: Sitting, Cuff Size: Adult Regular)   Pulse 59   Resp 16   Ht 1.626 m (5' 4\")   Wt 83.5 kg (184 lb)   BMI 31.58 kg/m    BMI= Body mass index is 31.58 kg/m .  Wt Readings from Last 3 Encounters:   11/12/24 83.5 kg (184 lb)   10/21/24 83.9 kg (185 lb)   06/07/24 85.7 kg (189 lb)       General: pleasant male. No acute distress.   Neck: No JVD  Lungs: clear to auscultation  COR:  regular rate and rhythm, No murmurs, rubs, or gallops  Extrem: No edema        Please refer above for cardiac ROS details.       Past History     Family History:   Family History   Problem Relation Age of Onset     Other - See Comments Mother         Cause of death unclear to him.     Dementia Father         \"Might have had a little bit of Alzheimer's coming on.\"     Other - See Comments Father         Cause of death unclear to him.     Aneurysm Brother         SAH from brain aneurysm.     Diabetes Maternal Grandmother      No Known Problems Daughter      No Known Problems Son         Social History:   Social History     Socioeconomic History     Marital status:      Spouse name: Not on file     Number of children: Not on file     Years of education: Not on file     Highest education level: Not on file   Occupational History     Not on file   Tobacco Use     Smoking status: Former     Current packs/day: 0.00     Types: Cigarettes     Quit date: 7/1/1995     " Years since quittin.3     Smokeless tobacco: Never     Tobacco comments:     Quit date was in .  Precise date is nominal.   Substance and Sexual Activity     Alcohol use: Yes     Alcohol/week: 1.0 standard drink of alcohol     Comment: Alcoholic Drinks/day: Beer when golfing.  A little wine.  Does not drink regularly.     Drug use: No     Sexual activity: Not on file   Other Topics Concern     Not on file   Social History Narrative     Not on file     Social Drivers of Health     Financial Resource Strain: Not on file   Food Insecurity: Not on file   Transportation Needs: Not on file   Physical Activity: Not on file   Stress: Not on file   Social Connections: Not on file   Interpersonal Safety: Not on file   Housing Stability: Not on file            Lab Results    Chemistry/lipid CBC Cardiac Enzymes/BNP/TSH/INR   Lab Results   Component Value Date    CHOL 137 2024    HDL 54 2024    TRIG 86 2024    BUN 15.4 10/21/2024     (L) 10/21/2024    CO2 24 10/21/2024    Lab Results   Component Value Date    WBC 20.7 (H) 10/21/2024    HGB 12.2 (L) 10/21/2024    HCT 37.5 (L) 10/21/2024    MCV 94 10/21/2024     10/21/2024    Lab Results   Component Value Date    TSH 2.55 2020          Cardiac Problems and Cardiac Diagnostics     Most Recent Cardiac testing:  ECG Personal interpretation  2024  NSR     ECHO 2024 ([facility])  LVEF 55-60%  Grad eI DD  Ascending aorta 4,4 cm  MAC  Mild aortic stenosis  IVC normal     Chest CT 2024 personal review:  CAC in LAD, LCX, RCA    Stress test: 2024     The regadenoson nuclear stress test is negative for inducible myocardial ischemia or infarction.     The left ventricular ejection fraction at stress is greater than 70%.     The patient is at a low risk of future cardiac ischemic events.     There is no prior study for comparison.         Assessment/Recommendations   Assessment:    Mr. Jonathan Blackwell is a 84 year old male with a  significant past history of HTN, HLD, CAC seen on CT scan, mild aortic stenosis, mild ascending aorta aneurysm, thrombosed saccular aneurysm and chronic dissection of descending abd aorta, who presents for follow up.     Mild aortic stenosis   - TTE in 3 years    Aortic pathology   - Mild ascending aorta aneurysm, thrombosed saccular aneurysm and chronic dissection of descending abd aorta   - Recommend he sees a vascular specialist to monitor this    Lightheadedness   - Likely related to orthostasis and hypotension - now resolved    JAMES   - Likely deconditioning.  NM stress test negative    Preclinical CAD   - LDL 66, continue lovastatin     LE edema   - Most likely venous insufficiency   - TTE with grade I diastolic dysfunction not consistent with CHF   - Conservative measures for swelling including leg elevation and if needed compression stockings.     HTN   - Well controlled   - Continue current management    RTC 12 months    The longitudinal plan of care for the diagnosis(es)/condition(s) as documented were addressed during this visit. Due to the added complexity in care, I will continue to support Jonathan in the subsequent management and with ongoing continuity of care.         Ignacio Mclaughlin DO [facility]  Non-invasive Cardiologist  [facility]         Thank you for allowing me to participate in the care of your patient.      Sincerely,     Ignacio Mclaughlin DO     [facility]  cc:   Ignacio Mclaughlin DO  [address] [facility] [address]  [place] [address]

## 2024-11-12 NOTE — PROGRESS NOTES
[facility] HEART CARE   [address]  [place] [address]   [email or web address]   OFFICE: [phone]     CARDIOLOGY CLINIC NOTE     Thank you, Gulshan Beckwith, for asking the [facility] Heart Care team to see Mr. Jonathan Blackwell to evaluate Follow Up   .        History of Present Illness   Mr. Jonathan Blackwell is a 84 year old male with a significant past history of HTN, HLD, CAC seen on CT scan, mild aortic stenosis, mild ascending aorta aneurysm, thrombosed saccular aneurysm and chronic dissection of descending abd aorta, who presents for follow up.  The patient notes he has been doing well from a heart perspective.  He notes his dizziness has essentially gone away after medication changes.  He denies dyspnea or chest pain.  He was in the ED recently with UTI.  His leg swelling is minimal.         Medications  Allergies   Current Outpatient Medications   Medication Sig Dispense Refill    cholecalciferol, vitamin D3, 1,000 unit tablet [CHOLECALCIFEROL, VITAMIN D3, 1,000 UNIT TABLET] Take 2,000 Units by mouth daily.      clotrimazole (LOTRIMIN) 1 % external cream Apply topically 2 times daily. 14 g 0    gabapentin (NEURONTIN) 300 MG capsule Take 300 mg by mouth at bedtime      losartan (COZAAR) 50 MG tablet Take 1 tablet (50 mg) by mouth daily 90 tablet 3    lovastatin (MEVACOR) 40 MG tablet TAKE HALF TABLET BY MOUTH AT BEDTIME 45 tablet 1    meloxicam (MOBIC) 7.5 MG tablet Take 7.5 mg by mouth daily as needed      tamsulosin (FLOMAX) 0.4 MG capsule [TAMSULOSIN (FLOMAX) 0.4 MG CAP] TAKE ONE CAPSULE BY MOUTH AT BEDTIME (Patient taking differently: 2 times daily. [TAMSULOSIN (FLOMAX) 0.4 MG CAP] TAKE ONE CAPSULE BY MOUTH AT BEDTIME) 90 capsule 0    vit C/vit E ac/lut/copper/zinc (PRESERVISION LUTEIN ORAL) Take 1 tablet by mouth 2 times daily      ciprofloxacin (CIPRO) 500 MG tablet Take 1 tablet (500 mg) by mouth 2 times daily. (Patient not taking: Reported on  "2024) 10 tablet 0      Allergies   Allergen Reactions    Lisinopril Other (See Comments)     Swollen lips, Other reaction(s): HIVES, LIP SWELLING, HIVES, LIP SWELLING, Swollen lips, Other reaction(s): HIVES, LIP SWELLING, HIVES, LIP SWELLING        Physical Examination Review of Systems   Vitals: /66 (BP Location: Left arm, Patient Position: Sitting, Cuff Size: Adult Regular)   Pulse 59   Resp 16   Ht 1.626 m (5' 4\")   Wt 83.5 kg (184 lb)   BMI 31.58 kg/m    BMI= Body mass index is 31.58 kg/m .  Wt Readings from Last 3 Encounters:   24 83.5 kg (184 lb)   10/21/24 83.9 kg (185 lb)   24 85.7 kg (189 lb)       General: pleasant male. No acute distress.   Neck: No JVD  Lungs: clear to auscultation  COR:  regular rate and rhythm, No murmurs, rubs, or gallops  Extrem: No edema        Please refer above for cardiac ROS details.       Past History     Family History:   Family History   Problem Relation Age of Onset    Other - See Comments Mother         Cause of death unclear to him.    Dementia Father         \"Might have had a little bit of Alzheimer's coming on.\"    Other - See Comments Father         Cause of death unclear to him.    Aneurysm Brother         SAH from brain aneurysm.    Diabetes Maternal Grandmother     No Known Problems Daughter     No Known Problems Son         Social History:   Social History     Socioeconomic History    Marital status:      Spouse name: Not on file    Number of children: Not on file    Years of education: Not on file    Highest education level: Not on file   Occupational History    Not on file   Tobacco Use    Smoking status: Former     Current packs/day: 0.00     Types: Cigarettes     Quit date: 1995     Years since quittin.3    Smokeless tobacco: Never    Tobacco comments:     Quit date was in .  Precise date is nominal.   Substance and Sexual Activity    Alcohol use: Yes     Alcohol/week: 1.0 standard drink of alcohol     Comment: " Alcoholic Drinks/day: Beer when golfing.  A little wine.  Does not drink regularly.    Drug use: No    Sexual activity: Not on file   Other Topics Concern    Not on file   Social History Narrative    Not on file     Social Drivers of Health     Financial Resource Strain: Not on file   Food Insecurity: Not on file   Transportation Needs: Not on file   Physical Activity: Not on file   Stress: Not on file   Social Connections: Not on file   Interpersonal Safety: Not on file   Housing Stability: Not on file            Lab Results    Chemistry/lipid CBC Cardiac Enzymes/BNP/TSH/INR   Lab Results   Component Value Date    CHOL 137 04/08/2024    HDL 54 04/08/2024    TRIG 86 04/08/2024    BUN 15.4 10/21/2024     (L) 10/21/2024    CO2 24 10/21/2024    Lab Results   Component Value Date    WBC 20.7 (H) 10/21/2024    HGB 12.2 (L) 10/21/2024    HCT 37.5 (L) 10/21/2024    MCV 94 10/21/2024     10/21/2024    Lab Results   Component Value Date    TSH 2.55 03/05/2020          Cardiac Problems and Cardiac Diagnostics     Most Recent Cardiac testing:  ECG Personal interpretation  5/24/2024  NSR     ECHO 5/30/2024 ([facility])  LVEF 55-60%  Grad eI DD  Ascending aorta 4,4 cm  MAC  Mild aortic stenosis  IVC normal     Chest CT 5/24/2024 personal review:  CAC in LAD, LCX, RCA    Stress test: 6/12/2024    The regadenoson nuclear stress test is negative for inducible myocardial ischemia or infarction.    The left ventricular ejection fraction at stress is greater than 70%.    The patient is at a low risk of future cardiac ischemic events.    There is no prior study for comparison.         Assessment/Recommendations   Assessment:    Mr. Jonathan Blackwell is a 84 year old male with a significant past history of HTN, HLD, CAC seen on CT scan, mild aortic stenosis, mild ascending aorta aneurysm, thrombosed saccular aneurysm and chronic dissection of descending abd aorta, who presents for follow up.     Mild aortic stenosis   - TTE in 3  years    Aortic pathology   - Mild ascending aorta aneurysm, thrombosed saccular aneurysm and chronic dissection of descending abd aorta   - Recommend he sees a vascular specialist to monitor this    Lightheadedness   - Likely related to orthostasis and hypotension - now resolved    JAMES   - Likely deconditioning.  NM stress test negative    Preclinical CAD   - LDL 66, continue lovastatin     LE edema   - Most likely venous insufficiency   - TTE with grade I diastolic dysfunction not consistent with CHF   - Conservative measures for swelling including leg elevation and if needed compression stockings.     HTN   - Well controlled   - Continue current management    RTC 12 months    The longitudinal plan of care for the diagnosis(es)/condition(s) as documented were addressed during this visit. Due to the added complexity in care, I will continue to support Jonathan in the subsequent management and with ongoing continuity of care.         Ignacio Mclaughlin DO [facility]  Non-invasive Cardiologist  [facility]

## 2024-11-12 NOTE — PATIENT INSTRUCTIONS
It was a pleasure meeting you today    In summary:    We will keep things the same.      Please call my nurse Michel Villalpando at [phone] if you have any questions or issues.    We will schedule a follow up visit in  12 months      Ignacio Mclaughlin DO [facility]  Non-invasive Cardiologist  [facility]

## 2025-06-09 ENCOUNTER — LAB REQUISITION (OUTPATIENT)
Dept: LAB | Facility: CLINIC | Age: 85
End: 2025-06-09

## 2025-06-09 DIAGNOSIS — D50.8 OTHER IRON DEFICIENCY ANEMIAS: ICD-10-CM

## 2025-06-09 DIAGNOSIS — E78.2 MIXED HYPERLIPIDEMIA: ICD-10-CM

## 2025-06-09 LAB
ALBUMIN SERPL BCG-MCNC: 3.7 G/DL (ref 3.5–5.2)
ALP SERPL-CCNC: 59 U/L (ref 40–150)
ALT SERPL W P-5'-P-CCNC: 17 U/L (ref 0–70)
ANION GAP SERPL CALCULATED.3IONS-SCNC: 11 MMOL/L (ref 7–15)
AST SERPL W P-5'-P-CCNC: 21 U/L (ref 0–45)
BILIRUB SERPL-MCNC: 0.4 MG/DL
BUN SERPL-MCNC: 13.7 MG/DL (ref 8–23)
CALCIUM SERPL-MCNC: 8.8 MG/DL (ref 8.8–10.4)
CHLORIDE SERPL-SCNC: 102 MMOL/L (ref 98–107)
CHOLEST SERPL-MCNC: 114 MG/DL
CREAT SERPL-MCNC: 0.89 MG/DL (ref 0.67–1.17)
EGFRCR SERPLBLD CKD-EPI 2021: 83 ML/MIN/1.73M2
ERYTHROCYTE [DISTWIDTH] IN BLOOD BY AUTOMATED COUNT: 14.6 % (ref 10–15)
FASTING STATUS PATIENT QL REPORTED: NORMAL
FASTING STATUS PATIENT QL REPORTED: NORMAL
GLUCOSE SERPL-MCNC: 97 MG/DL (ref 70–99)
HCO3 SERPL-SCNC: 26 MMOL/L (ref 22–29)
HCT VFR BLD AUTO: 38.1 % (ref 40–53)
HDLC SERPL-MCNC: 61 MG/DL
HGB BLD-MCNC: 12.2 G/DL (ref 13.3–17.7)
LDLC SERPL CALC-MCNC: 40 MG/DL
MCH RBC QN AUTO: 31 PG (ref 26.5–33)
MCHC RBC AUTO-ENTMCNC: 32 G/DL (ref 31.5–36.5)
MCV RBC AUTO: 97 FL (ref 78–100)
NONHDLC SERPL-MCNC: 53 MG/DL
PLATELET # BLD AUTO: 230 10E3/UL (ref 150–450)
POTASSIUM SERPL-SCNC: 4.4 MMOL/L (ref 3.4–5.3)
PROT SERPL-MCNC: 6.5 G/DL (ref 6.4–8.3)
RBC # BLD AUTO: 3.93 10E6/UL (ref 4.4–5.9)
SODIUM SERPL-SCNC: 139 MMOL/L (ref 135–145)
TRIGL SERPL-MCNC: 67 MG/DL
WBC # BLD AUTO: 5.9 10E3/UL (ref 4–11)

## 2025-06-09 PROCEDURE — 82040 ASSAY OF SERUM ALBUMIN: CPT | Performed by: FAMILY MEDICINE

## 2025-06-09 PROCEDURE — 80061 LIPID PANEL: CPT | Performed by: FAMILY MEDICINE

## 2025-06-09 PROCEDURE — 85041 AUTOMATED RBC COUNT: CPT | Performed by: FAMILY MEDICINE

## 2025-06-17 DIAGNOSIS — I10 PRIMARY HYPERTENSION: ICD-10-CM

## 2025-06-17 DIAGNOSIS — R06.09 EXERTIONAL DYSPNEA: ICD-10-CM

## 2025-06-17 RX ORDER — LOSARTAN POTASSIUM 50 MG/1
50 TABLET ORAL DAILY
Qty: 90 TABLET | Refills: 0 | Status: SHIPPED | OUTPATIENT
Start: 2025-06-17